# Patient Record
Sex: FEMALE | Race: BLACK OR AFRICAN AMERICAN | NOT HISPANIC OR LATINO | Employment: UNEMPLOYED | RURAL
[De-identification: names, ages, dates, MRNs, and addresses within clinical notes are randomized per-mention and may not be internally consistent; named-entity substitution may affect disease eponyms.]

---

## 2020-08-13 ENCOUNTER — HISTORICAL (OUTPATIENT)
Dept: ADMINISTRATIVE | Facility: HOSPITAL | Age: 50
End: 2020-08-13

## 2022-02-08 ENCOUNTER — OFFICE VISIT (OUTPATIENT)
Dept: PRIMARY CARE CLINIC | Facility: CLINIC | Age: 52
End: 2022-02-08
Payer: MEDICARE

## 2022-02-08 VITALS
DIASTOLIC BLOOD PRESSURE: 110 MMHG | RESPIRATION RATE: 20 BRPM | TEMPERATURE: 100 F | OXYGEN SATURATION: 97 % | HEART RATE: 110 BPM | BODY MASS INDEX: 45.45 KG/M2 | SYSTOLIC BLOOD PRESSURE: 186 MMHG | HEIGHT: 62 IN | WEIGHT: 247 LBS

## 2022-02-08 DIAGNOSIS — D64.9 ANEMIA, UNSPECIFIED TYPE: ICD-10-CM

## 2022-02-08 DIAGNOSIS — E78.5 HYPERLIPIDEMIA, UNSPECIFIED HYPERLIPIDEMIA TYPE: Primary | ICD-10-CM

## 2022-02-08 DIAGNOSIS — I10 ESSENTIAL HYPERTENSION, BENIGN: ICD-10-CM

## 2022-02-08 DIAGNOSIS — E11.9 DIABETES MELLITUS WITHOUT COMPLICATION: Primary | ICD-10-CM

## 2022-02-08 PROCEDURE — 99214 PR OFFICE/OUTPT VISIT, EST, LEVL IV, 30-39 MIN: ICD-10-PCS | Mod: ,,, | Performed by: NURSE PRACTITIONER

## 2022-02-08 PROCEDURE — 99214 OFFICE O/P EST MOD 30 MIN: CPT | Mod: ,,, | Performed by: NURSE PRACTITIONER

## 2022-02-08 RX ORDER — LISINOPRIL 40 MG/1
40 TABLET ORAL DAILY
Qty: 90 TABLET | Refills: 1 | Status: SHIPPED | OUTPATIENT
Start: 2022-02-08 | End: 2022-04-11 | Stop reason: SDUPTHER

## 2022-02-08 RX ORDER — DAPAGLIFLOZIN 10 MG/1
10 TABLET, FILM COATED ORAL DAILY
Qty: 90 TABLET | Refills: 1 | Status: SHIPPED | OUTPATIENT
Start: 2022-02-08 | End: 2022-04-13

## 2022-02-08 RX ORDER — METFORMIN HYDROCHLORIDE 500 MG/1
1000 TABLET ORAL 2 TIMES DAILY WITH MEALS
COMMUNITY
End: 2022-02-08 | Stop reason: SDUPTHER

## 2022-02-08 RX ORDER — LISINOPRIL 40 MG/1
40 TABLET ORAL DAILY
COMMUNITY
End: 2022-02-08 | Stop reason: SDUPTHER

## 2022-02-08 RX ORDER — ROSUVASTATIN CALCIUM 10 MG/1
10 TABLET, COATED ORAL NIGHTLY
Qty: 90 TABLET | Refills: 3 | Status: SHIPPED | OUTPATIENT
Start: 2022-02-08 | End: 2022-04-11

## 2022-02-08 RX ORDER — DAPAGLIFLOZIN 10 MG/1
10 TABLET, FILM COATED ORAL DAILY
COMMUNITY
End: 2022-02-08 | Stop reason: SDUPTHER

## 2022-02-08 RX ORDER — METFORMIN HYDROCHLORIDE 500 MG/1
1000 TABLET ORAL 2 TIMES DAILY WITH MEALS
Qty: 180 TABLET | Refills: 1 | Status: SHIPPED | OUTPATIENT
Start: 2022-02-08 | End: 2022-05-11

## 2022-02-08 RX ORDER — FERROUS SULFATE 324(65)MG
324 TABLET, DELAYED RELEASE (ENTERIC COATED) ORAL 2 TIMES DAILY
Qty: 180 TABLET | Refills: 1 | Status: SHIPPED | OUTPATIENT
Start: 2022-02-08 | End: 2022-02-22

## 2022-02-08 NOTE — PATIENT INSTRUCTIONS
Patient Education       DASH Diet   About this topic   DASH stands for Dietary Approaches to Stop Hypertension. The DASH diet may help you lower blood pressure. It may also help keep you from getting high blood pressure. You will eat less fat and more fiber on the DASH diet.  This diet gives you more minerals that fight high blood pressure. Some nutrients in this diet are:  · Potassium ? Acts to help you get rid of salt. This may help to lower blood pressure.  · Calcium ? Makes blood vessels and muscles work the right way  · Magnesium - Helps blood vessels relax  · Fiber ? Helps you feel full. It also helps digestion.  What will the results be?   The DASH diet may help you:  · Lower your blood pressure and cholesterol  · Lower your risk for cancer, heart disease, heart attack, and stroke. It may also lower your risk for heart failure, kidney stones, and diabetes.  · Lose weight or keep a healthy weight  What lifestyle changes are needed?   · Add regular exercise to get the most help from this diet.  · Try to lower stress. Find ways to relax.  · Stop smoking. Avoid secondhand smoke.  · Limit alcohol intake.  What changes to diet are needed?   · Know about poor eating habits. Then, you can fix them as you work with the program.  · This diet encourages fruits and vegetables, whole grains, lean meats, healthy fats, and low-fat or fat-free dairy products.  · This diet is lower in saturated fats, trans-fats, cholesterol, added sugars, and sodium.  Who should use this diet?   This eating plan is good for the whole family. It is also good for people with high blood pressure and those at risk for high blood pressure.  What foods are good to eat?   · Grains: Try to eat 6 to 8 servings of whole grain, high fiber foods each day. These are bread, cereals, brown rice, or pasta.  · Fruits and vegetables: Eat 4 to 5 servings each day. Try to pick many kinds and colors. Fresh or frozen are best. Look for low sodium or salt-free if  you choose canned.  · Dairy: Try to eat 2 to 3 servings of fat free and low fat milk products each day.  · Lean meats, poultry, and seafood: Try to eat 6 servings or less of lean meats, poultry, and seafood each day. Try to choose more low fat or lean meats like chicken and turkey. Eat less red meat. Eat more fish instead.  · Nuts, seeds, and legumes (dry beans and peas): Try to eat 4 to 5 servings each week. Try to pick nuts such as almonds and walnuts, sunflower seeds, peanut butter, soy beans, lentils, kidney beans, and split peas.  · Fats and oils: Try to eat 2 to 3 servings of fats and oils each day. Eat good fats found in fish, nuts, and avocados. Try using olive oil or vegetable oils such as canola oil. Other good oils to try are corn, safflower, sunflower, or soybean oils. Use low-sodium and low-fat salad dressing and mayonnaise.  · Condiments: Pepper, herbs, spices, vinegar, lemon or lime juices are great for seasoning. Be careful to choose low-sodium or salt-free products if you use broths, soups, or soy sauce.  · Sweets: Try to eat less than 5 servings each week. Choose low-fat and trans fat-free desserts. These are things like fruit flavored gelatin, sorbet, jelly beans, charline crackers, animal crackers, low-fat fig bars, and chele snaps. Eat fruit to satisfy your desire for sweets.     What foods should be limited or avoided?   · Grains: Salted breads, rolls, crackers, quick breads, self-rising flours, biscuit mixes, regular bread crumbs, instant hot cereals, commercially-prepared rice, pasta, stuffing mixes  · Fruits and vegetables: Commercially-prepared potatoes and vegetable mixes, regular canned vegetables and juices, vegetables frozen with sauce or pickled vegetables, processed fruits with salt or sodium  · Milk: Whole milk, malted milk, chocolate milk, buttermilk, cheese, ice cream  · Meats and beans: Smoked, cured, salted, or canned fish; meats or poultry such as lua, sausages, sardines;  high-fat cuts of meat like beef, lamb, or pork; chicken with the skin on it  · Fats: Cut back on solid fats like butter, lard, and margarine. Eat less food with high saturated fat, cholesterol and total fat.  · Condiments and snacks: Salted and canned peas, beans, and olives; salted snack foods; fried foods; soda or other sweetened drinks  · Sweets: High-fat baked goods such as muffins, donuts, pastries, commercial baked goods, candy bars  · If you choose to drink alcohol, limit the amount you drink. Women should have 1 drink or less per day and men should have 2 drinks or less per day.  Helpful tips   · Avoid eating canned vegetables and processed foods. These have a lot of salt in them. Look for a low-salt or low-sodium choice.  · Try baking or broiling instead of frying food.  · Write down the foods you eat. This will help you track what you have eaten each week.  · When you go to a grocery store, have a list or a meal plan. Do not shop when you are hungry to avoid cravings for foods.  · Read food labels with care. They will show you how much is in a serving. The amount is given as a percentage of the total amount you need each day. Reading labels will help you make healthy food choices.       · Avoid fast foods.  · Talk to your doctor or dietitian to see if you need vitamin and mineral supplements to help you balance your diet.  · Talk to a dietitian for help.  Where can I learn more?   Academy of Nutrition and Dietetics  https://www.eatright.org/health/wellness/heart-and-cardiovascular-health/dash-diet-reducing-hypertension-through-diet-and-lifestyle   FamilyDoctor.org  http://familydoctor.org/familydoctor/en/prevention-wellness/food-nutrition/weight-loss/the-dash-diet-healthy-eating-to-control-your-blood-pressure.html   Last Reviewed Date   2021-03-15  Consumer Information Use and Disclaimer   This information is not specific medical advice and does not replace information you receive from your health care  provider. This is only a brief summary of general information. It does NOT include all information about conditions, illnesses, injuries, tests, procedures, treatments, therapies, discharge instructions or life-style choices that may apply to you. You must talk with your health care provider for complete information about your health and treatment options. This information should not be used to decide whether or not to accept your health care providers advice, instructions or recommendations. Only your health care provider has the knowledge and training to provide advice that is right for you.  Copyright   Copyright © 2021 UpTo[employer], Inc. and its affiliates and/or licensors. All rights reserved.  Patient Education       High Blood Pressure Discharge Instructions   About this topic   The medical name for high blood pressure is hypertension. Your blood pressure is measured with 2 numbers. For example, you may hear the staff say your blood pressure is 130 over 80. High blood pressure cannot be cured. You must control it with drugs and lifestyle changes. High blood pressure puts you at risk for heart attack, stroke, and kidney disease.         What care is needed at home?   · Ask your doctor what you need to do when you go home. Make sure you ask questions if you do not understand what the doctor says. This way you will know what you need to do.  · Take all your medicines as ordered. Do not stop taking any of your regular medicines without talking to your doctor.  · Learn how to check your blood pressure at home, if your doctor suggests you do this.  What follow-up care is needed?   Your doctor may ask you to make visits to the office to check on your progress. Be sure to keep these visits.  What drugs may be needed?   The doctor may order drugs to help control your high blood pressure. Take your drugs as ordered. Do not take other prescription drugs or over-the-counter (OTC) drugs without talking to your doctor  first.  Will physical activity be limited?   Talk to your doctor about the right amount of activity for you. Exercise may help you lose weight and lower your blood pressure.  What changes to diet are needed?   · Do not use salt on your food. Use herbs and spices to improve the taste.  · Eat less than 1,500 mg of sodium a day. Read food labels to see how much sodium is in a food.  · Limit coffee, tea, and soda to 2 cups (480 mL) a day.  · Limit beer, wine, and mixed drinks (alcohol) to 1 drink a day for women and 2 drinks a day for men.  · Eat lots of fruits, vegetables, and low-fat dairy products.  · Avoid fatty foods like fried foods or chips.  · Talk with your dietitian about the diet changes you need and the number of calories you should eat each day.  What problems could happen?   · Heart attack, heart failure, or other heart problems  · Stroke  · Swelling of blood vessels  · Kidney failure  · Loss of eyesight  · Memory problems  · Fluid in the lungs  · Death  What can be done to prevent this health problem?   · Exercise regularly. Try to get at least 30 minutes of exercise most days of the week.  · Avoid weight gain.  · Stop smoking. Your doctor can tell you about stop smoking programs.  · Learn to lower stress.  When do I need to call the doctor?   · You have signs of a heart attack, which may include:  ? Severe chest pain, pressure, or discomfort with:  § Breathing trouble, sweating, upset stomach, or cold, clammy skin  § Pain in your arms, back, or jaw  § Worse pain with activity like walking up stairs  ? Fast or irregular heartbeat  ? Feeling dizzy, faint, or weak  · You have signs of stroke like sudden:  ? Numbness or weakness of the face, arm, or leg, especially on one side of the body  ? Confusion, trouble speaking, or understanding  ? Trouble seeing in one or both eyes  ? Trouble walking, dizziness, loss of balance, or coordination  ? Severe headache with no known cause  · You have a seizure or pass  out.  · You have a severe headache with an upset stomach or throwing up.  · You have sudden, severe back pain.  · You have 2 home blood pressure readings higher than 180/120.  · Your urine is brown or bloody.  Teach Back: Helping You Understand   The Teach Back Method helps you understand the information we are giving you. After you talk with the staff, tell them in your own words what you learned. This helps to make sure the staff has described each thing clearly. It also helps to explain things that may have been confusing. Before going home, make sure you can do these:  · I can tell you about my condition.  · I can tell you what numbers are too high for my blood pressure.  · I can tell you what I will do if I have signs of a heart attack or stroke.  Where can I learn more?   American Academy of Family Physicians  https://familydoctor.org/condition/high-blood-pressure/   American Academy of Family Physicians  https://familydoctor.org/the-dash-diet-healthy-eating-to-control-your-blood-pressure/   American Heart Association  http://www.heart.org/HEARTORG/Conditions/HighBloodPressure/AboutHighBloodPressure/About-High-Blood-Pressure_UC_002050_Article.jsp#.B7rS2GynA0d   NHS Choices  http://www.nhs.uk/conditions/blood-pressure-(high)/pages/introduction.aspx   [employer]  https://www.[employer]/contents/high-blood-pressure-in-adults-beyond-the-basics?source=see_link   Last Reviewed Date   2021-06-08  Consumer Information Use and Disclaimer   This information is not specific medical advice and does not replace information you receive from your health care provider. This is only a brief summary of general information. It does NOT include all information about conditions, illnesses, injuries, tests, procedures, treatments, therapies, discharge instructions or life-style choices that may apply to you. You must talk with your health care provider for complete information about your health and treatment options. This  information should not be used to decide whether or not to accept your health care providers advice, instructions or recommendations. Only your health care provider has the knowledge and training to provide advice that is right for you.  Copyright   Copyright © 2021 UpToDate, Inc. and its affiliates and/or licensors. All rights reserved.

## 2022-02-08 NOTE — PROGRESS NOTES
Subjective:       Patient ID: Alley Banks is a 51 y.o. female.    Chief Complaint: Establish Care (Has been out of meds for awhile. Has appt with quang heck in April.)    Pt presents to establish care. Pt states she has been out of medications for months.     Review of Systems   Constitutional: Negative for activity change, appetite change, chills, fatigue and fever.   HENT: Negative for nasal congestion, ear discharge, nosebleeds, postnasal drip, rhinorrhea, sinus pressure/congestion, sneezing, sore throat and tinnitus.    Eyes: Negative for pain, discharge, redness and itching.   Respiratory: Negative for cough, choking, chest tightness, shortness of breath and wheezing.    Cardiovascular: Negative for chest pain.   Gastrointestinal: Negative for abdominal distention, abdominal pain, blood in stool, change in bowel habit, constipation, diarrhea, nausea, vomiting and change in bowel habit.   Genitourinary: Negative for decreased urine volume, dysuria, flank pain and frequency.   Musculoskeletal: Negative for back pain and gait problem.   Integumentary:  Negative for wound, breast mass and breast discharge.   Allergic/Immunologic: Negative for immunocompromised state.   Neurological: Negative for dizziness, light-headedness and headaches.   Psychiatric/Behavioral: Negative for agitation, behavioral problems and hallucinations.   Breast: Negative for mass        Objective:      Physical Exam  Vitals and nursing note reviewed.   Constitutional:       Appearance: Normal appearance.   Cardiovascular:      Rate and Rhythm: Normal rate and regular rhythm.      Heart sounds: Normal heart sounds.   Pulmonary:      Effort: Pulmonary effort is normal.      Breath sounds: Normal breath sounds.   Musculoskeletal:         General: Normal range of motion.   Neurological:      Mental Status: She is alert and oriented to person, place, and time.   Psychiatric:         Behavior: Behavior normal.         Assessment:       Problem  List Items Addressed This Visit    None     Visit Diagnoses     Diabetes mellitus without complication    -  Primary    Relevant Medications    metFORMIN (GLUCOPHAGE) 500 MG tablet    lisinopriL (PRINIVIL,ZESTRIL) 40 MG tablet    dapagliflozin (FARXIGA) 10 mg tablet    Other Relevant Orders    Hemoglobin A1C    Essential hypertension, benign        Relevant Orders    CBC Auto Differential    Comprehensive Metabolic Panel    Lipid Panel    TSH          Plan:       Will call pt with lab results. Return to the clinic in 2 weeks for a hypertension follow-up. Go to the er with any hypertensive symptoms. Pt has an appointment with LEX Montalvo in April for diabetes management.

## 2022-02-22 ENCOUNTER — OFFICE VISIT (OUTPATIENT)
Dept: PRIMARY CARE CLINIC | Facility: CLINIC | Age: 52
End: 2022-02-22
Payer: MEDICARE

## 2022-02-22 VITALS
TEMPERATURE: 100 F | WEIGHT: 245 LBS | BODY MASS INDEX: 45.08 KG/M2 | HEART RATE: 102 BPM | RESPIRATION RATE: 18 BRPM | SYSTOLIC BLOOD PRESSURE: 106 MMHG | DIASTOLIC BLOOD PRESSURE: 60 MMHG | HEIGHT: 62 IN | OXYGEN SATURATION: 98 %

## 2022-02-22 DIAGNOSIS — E11.9 DIABETES MELLITUS WITHOUT COMPLICATION: ICD-10-CM

## 2022-02-22 DIAGNOSIS — I10 ESSENTIAL HYPERTENSION, BENIGN: Primary | ICD-10-CM

## 2022-02-22 DIAGNOSIS — E78.5 HYPERLIPIDEMIA, UNSPECIFIED HYPERLIPIDEMIA TYPE: ICD-10-CM

## 2022-02-22 PROCEDURE — 4010F PR ACE/ARB THEARPY RXD/TAKEN: ICD-10-PCS | Mod: CPTII,,, | Performed by: NURSE PRACTITIONER

## 2022-02-22 PROCEDURE — 3008F PR BODY MASS INDEX (BMI) DOCUMENTED: ICD-10-PCS | Mod: CPTII,,, | Performed by: NURSE PRACTITIONER

## 2022-02-22 PROCEDURE — 3046F PR MOST RECENT HEMOGLOBIN A1C LEVEL > 9.0%: ICD-10-PCS | Mod: CPTII,,, | Performed by: NURSE PRACTITIONER

## 2022-02-22 PROCEDURE — 99212 OFFICE O/P EST SF 10 MIN: CPT | Mod: ,,, | Performed by: NURSE PRACTITIONER

## 2022-02-22 PROCEDURE — 4010F ACE/ARB THERAPY RXD/TAKEN: CPT | Mod: CPTII,,, | Performed by: NURSE PRACTITIONER

## 2022-02-22 PROCEDURE — 3074F PR MOST RECENT SYSTOLIC BLOOD PRESSURE < 130 MM HG: ICD-10-PCS | Mod: CPTII,,, | Performed by: NURSE PRACTITIONER

## 2022-02-22 PROCEDURE — 3008F BODY MASS INDEX DOCD: CPT | Mod: CPTII,,, | Performed by: NURSE PRACTITIONER

## 2022-02-22 PROCEDURE — 3074F SYST BP LT 130 MM HG: CPT | Mod: CPTII,,, | Performed by: NURSE PRACTITIONER

## 2022-02-22 PROCEDURE — 3078F DIAST BP <80 MM HG: CPT | Mod: CPTII,,, | Performed by: NURSE PRACTITIONER

## 2022-02-22 PROCEDURE — 1160F PR REVIEW ALL MEDS BY PRESCRIBER/CLIN PHARMACIST DOCUMENTED: ICD-10-PCS | Mod: CPTII,,, | Performed by: NURSE PRACTITIONER

## 2022-02-22 PROCEDURE — 1159F MED LIST DOCD IN RCRD: CPT | Mod: CPTII,,, | Performed by: NURSE PRACTITIONER

## 2022-02-22 PROCEDURE — 3046F HEMOGLOBIN A1C LEVEL >9.0%: CPT | Mod: CPTII,,, | Performed by: NURSE PRACTITIONER

## 2022-02-22 PROCEDURE — 1160F RVW MEDS BY RX/DR IN RCRD: CPT | Mod: CPTII,,, | Performed by: NURSE PRACTITIONER

## 2022-02-22 PROCEDURE — 1159F PR MEDICATION LIST DOCUMENTED IN MEDICAL RECORD: ICD-10-PCS | Mod: CPTII,,, | Performed by: NURSE PRACTITIONER

## 2022-02-22 PROCEDURE — 99212 PR OFFICE/OUTPT VISIT, EST, LEVL II, 10-19 MIN: ICD-10-PCS | Mod: ,,, | Performed by: NURSE PRACTITIONER

## 2022-02-22 PROCEDURE — 3078F PR MOST RECENT DIASTOLIC BLOOD PRESSURE < 80 MM HG: ICD-10-PCS | Mod: CPTII,,, | Performed by: NURSE PRACTITIONER

## 2022-02-22 RX ORDER — FERROUS SULFATE TAB 325 MG (65 MG ELEMENTAL FE) 325 (65 FE) MG
TAB ORAL 2 TIMES DAILY
COMMUNITY
Start: 2022-02-08

## 2022-02-22 NOTE — PROGRESS NOTES
Subjective:       Patient ID: Alley Banks is a 51 y.o. female.    Chief Complaint: Follow-up (2 week f/u.)    Pt presents for a hypertension follow-up. Pt is requesting a glucometer.     Review of Systems   Constitutional: Negative for activity change, appetite change, chills, fatigue and fever.   HENT: Negative for nasal congestion, ear discharge, nosebleeds, postnasal drip, rhinorrhea, sinus pressure/congestion, sneezing, sore throat and tinnitus.    Eyes: Negative for pain, discharge, redness and itching.   Respiratory: Negative for cough, choking, chest tightness, shortness of breath and wheezing.    Cardiovascular: Negative for chest pain.   Gastrointestinal: Negative for abdominal distention, abdominal pain, blood in stool, change in bowel habit, constipation, diarrhea, nausea, vomiting and change in bowel habit.   Genitourinary: Negative for decreased urine volume, dysuria, flank pain and frequency.   Musculoskeletal: Negative for back pain and gait problem.   Integumentary:  Negative for wound, breast mass and breast discharge.   Allergic/Immunologic: Negative for immunocompromised state.   Neurological: Negative for dizziness, light-headedness and headaches.   Psychiatric/Behavioral: Negative for agitation, behavioral problems and hallucinations.   Breast: Negative for mass        Objective:      Physical Exam  Vitals and nursing note reviewed.   Constitutional:       Appearance: Normal appearance.   Cardiovascular:      Rate and Rhythm: Normal rate and regular rhythm.      Heart sounds: Normal heart sounds.   Pulmonary:      Effort: Pulmonary effort is normal.      Breath sounds: Normal breath sounds.   Musculoskeletal:         General: Normal range of motion.   Neurological:      Mental Status: She is alert and oriented to person, place, and time.   Psychiatric:         Behavior: Behavior normal.         Assessment:       Problem List Items Addressed This Visit    None     Visit Diagnoses     Essential  hypertension, benign    -  Primary    Diabetes mellitus without complication        Hyperlipidemia, unspecified hyperlipidemia type              Plan:       Gave pt a Gatsanjay and Veto glucometer bag. Return to the clinic in 3 months for labs and a visit.

## 2022-04-11 ENCOUNTER — OFFICE VISIT (OUTPATIENT)
Dept: DIABETES SERVICES | Facility: CLINIC | Age: 52
End: 2022-04-11
Payer: MEDICARE

## 2022-04-11 VITALS
HEART RATE: 90 BPM | HEIGHT: 62 IN | WEIGHT: 246.38 LBS | SYSTOLIC BLOOD PRESSURE: 156 MMHG | OXYGEN SATURATION: 95 % | BODY MASS INDEX: 45.34 KG/M2 | RESPIRATION RATE: 16 BRPM | DIASTOLIC BLOOD PRESSURE: 108 MMHG

## 2022-04-11 DIAGNOSIS — E11.9 DIABETES MELLITUS WITHOUT COMPLICATION: ICD-10-CM

## 2022-04-11 DIAGNOSIS — E11.9 TYPE 2 DIABETES MELLITUS WITHOUT COMPLICATION, WITHOUT LONG-TERM CURRENT USE OF INSULIN: Primary | ICD-10-CM

## 2022-04-11 DIAGNOSIS — I10 PRIMARY HYPERTENSION: ICD-10-CM

## 2022-04-11 DIAGNOSIS — E78.5 HYPERLIPIDEMIA, UNSPECIFIED HYPERLIPIDEMIA TYPE: ICD-10-CM

## 2022-04-11 LAB — GLUCOSE SERPL-MCNC: 165 MG/DL (ref 70–110)

## 2022-04-11 PROCEDURE — 99215 OFFICE O/P EST HI 40 MIN: CPT | Mod: PBBFAC | Performed by: NURSE PRACTITIONER

## 2022-04-11 PROCEDURE — 4010F ACE/ARB THERAPY RXD/TAKEN: CPT | Mod: CPTII,,, | Performed by: NURSE PRACTITIONER

## 2022-04-11 PROCEDURE — 3046F HEMOGLOBIN A1C LEVEL >9.0%: CPT | Mod: CPTII,,, | Performed by: NURSE PRACTITIONER

## 2022-04-11 PROCEDURE — 3008F PR BODY MASS INDEX (BMI) DOCUMENTED: ICD-10-PCS | Mod: CPTII,,, | Performed by: NURSE PRACTITIONER

## 2022-04-11 PROCEDURE — 99214 OFFICE O/P EST MOD 30 MIN: CPT | Mod: S$PBB,,, | Performed by: NURSE PRACTITIONER

## 2022-04-11 PROCEDURE — 1159F MED LIST DOCD IN RCRD: CPT | Mod: CPTII,,, | Performed by: NURSE PRACTITIONER

## 2022-04-11 PROCEDURE — 3008F BODY MASS INDEX DOCD: CPT | Mod: CPTII,,, | Performed by: NURSE PRACTITIONER

## 2022-04-11 PROCEDURE — 3077F SYST BP >= 140 MM HG: CPT | Mod: CPTII,,, | Performed by: NURSE PRACTITIONER

## 2022-04-11 PROCEDURE — 82962 GLUCOSE BLOOD TEST: CPT | Mod: PBBFAC | Performed by: NURSE PRACTITIONER

## 2022-04-11 PROCEDURE — 3077F PR MOST RECENT SYSTOLIC BLOOD PRESSURE >= 140 MM HG: ICD-10-PCS | Mod: CPTII,,, | Performed by: NURSE PRACTITIONER

## 2022-04-11 PROCEDURE — 1160F PR REVIEW ALL MEDS BY PRESCRIBER/CLIN PHARMACIST DOCUMENTED: ICD-10-PCS | Mod: CPTII,,, | Performed by: NURSE PRACTITIONER

## 2022-04-11 PROCEDURE — 1159F PR MEDICATION LIST DOCUMENTED IN MEDICAL RECORD: ICD-10-PCS | Mod: CPTII,,, | Performed by: NURSE PRACTITIONER

## 2022-04-11 PROCEDURE — 4010F PR ACE/ARB THEARPY RXD/TAKEN: ICD-10-PCS | Mod: CPTII,,, | Performed by: NURSE PRACTITIONER

## 2022-04-11 PROCEDURE — 3046F PR MOST RECENT HEMOGLOBIN A1C LEVEL > 9.0%: ICD-10-PCS | Mod: CPTII,,, | Performed by: NURSE PRACTITIONER

## 2022-04-11 PROCEDURE — 1160F RVW MEDS BY RX/DR IN RCRD: CPT | Mod: CPTII,,, | Performed by: NURSE PRACTITIONER

## 2022-04-11 PROCEDURE — 99214 PR OFFICE/OUTPT VISIT, EST, LEVL IV, 30-39 MIN: ICD-10-PCS | Mod: S$PBB,,, | Performed by: NURSE PRACTITIONER

## 2022-04-11 PROCEDURE — 3080F DIAST BP >= 90 MM HG: CPT | Mod: CPTII,,, | Performed by: NURSE PRACTITIONER

## 2022-04-11 PROCEDURE — 3080F PR MOST RECENT DIASTOLIC BLOOD PRESSURE >= 90 MM HG: ICD-10-PCS | Mod: CPTII,,, | Performed by: NURSE PRACTITIONER

## 2022-04-11 RX ORDER — LISINOPRIL 40 MG/1
40 TABLET ORAL DAILY
Qty: 90 TABLET | Refills: 1 | Status: SHIPPED | OUTPATIENT
Start: 2022-04-11 | End: 2022-08-02

## 2022-04-11 RX ORDER — AMLODIPINE BESYLATE 10 MG/1
10 TABLET ORAL DAILY
Qty: 90 TABLET | Refills: 1 | Status: SHIPPED | OUTPATIENT
Start: 2022-04-11 | End: 2022-08-02

## 2022-04-11 RX ORDER — ROSUVASTATIN CALCIUM 20 MG/1
20 TABLET, COATED ORAL DAILY
Qty: 90 TABLET | Refills: 3 | Status: SHIPPED | OUTPATIENT
Start: 2022-04-11 | End: 2022-11-16 | Stop reason: SDUPTHER

## 2022-04-11 NOTE — PROGRESS NOTES
Subjective:       Patient ID: Alley Banks is a 52 y.o. female.    Chief Complaint: Diabetes Mellitus (Pt here for follow up visit, reports her sugars are usually good, checks 1 x day.)    Here today for follow up, has been lost to follow up since 10/2020.  She was seen by BENJAMÍN Abraham last month and a1c was 9.1.  Started on farxiga 10mg. Given [facility] meter and supplies but has not received supplies.  We will ensure that they have an order and she has been advised to answer the phone so she can approve shipment. She did not bring in her meter and reports that glucose has been better since starting farxiga but is really not able to give me examples of readings.  Her blood pressure is uncontrolled today as well.  She has a speech impediment and it is difficult to get an understanding as to how much information she is able to understand in regards to education. For this reason we will refer to home health for assistance in ensuring she gets supplies and is checking her glucose as well as monitoring her blood pressure and ensuring she is taking medications as directed. Advised to increase crestor as last ldl is uncontrolled.    Review of Systems   Constitutional: Negative for activity change, appetite change, diaphoresis and fatigue.   HENT: Negative for nasal congestion, facial swelling and sinus pressure/congestion.    Eyes: Negative for visual disturbance.   Respiratory: Negative for shortness of breath and wheezing.    Cardiovascular: Negative for chest pain and leg swelling.   Gastrointestinal: Negative for constipation, diarrhea, nausea and vomiting.   Endocrine: Negative for polydipsia, polyphagia and polyuria.   Genitourinary: Negative for dysuria, frequency and urgency.   Musculoskeletal: Negative for gait problem and myalgias.   Integumentary:  Negative for color change, rash and wound.   Neurological: Negative for dizziness, syncope, weakness, headaches, disturbances in coordination and coordination  difficulties.   Hematological: Does not bruise/bleed easily.   Psychiatric/Behavioral: Negative for self-injury, sleep disturbance and suicidal ideas. The patient is not nervous/anxious.          Objective:      Physical Exam  Vitals and nursing note reviewed.   Constitutional:       Appearance: Normal appearance.   HENT:      Head: Normocephalic.   Cardiovascular:      Rate and Rhythm: Normal rate.   Pulmonary:      Effort: Pulmonary effort is normal.   Musculoskeletal:         General: Normal range of motion.   Skin:     General: Skin is warm and dry.   Neurological:      General: No focal deficit present.      Mental Status: She is alert and oriented to person, place, and time.   Psychiatric:         Mood and Affect: Mood normal.         Behavior: Behavior normal.         Thought Content: Thought content normal.         Judgment: Judgment normal.         Assessment:       Problem List Items Addressed This Visit        Cardiac/Vascular    Hyperlipidemia    Hypertension       Endocrine    Diabetes mellitus, type 2 - Primary    Relevant Orders    POCT Glucose, Hand-Held Device (Completed)          Plan:       Problem List Items Addressed This Visit        Cardiac/Vascular    Hyperlipidemia    Hypertension       Endocrine    Diabetes mellitus, type 2 - Primary    Relevant Orders    POCT Glucose, Hand-Held Device (Completed)             Continue on lisinopril 40mg daily and add norvasc 10mg once daily for blood pressure  Continue metformin and farxgia and checking glucose fasting and 2 hours after last meal of the day.  Bring in meter to next visit in 2 months for a1c follow up.

## 2022-04-11 NOTE — PATIENT INSTRUCTIONS
Increase crestor to 20mg, add [facility]  Referral to home health  Referral to ophthalmologist      Snacks with 0-5 grams carbs   Hard Boiled Egg   Crystal Light, Vitamin Water, Powerade Zero   Herbal tea, unsweetened   8 oz unsweetened almond milk   2 tsp peanut butter on celery   ½ cup sugar-free Jell-O   1 sugar-free popsicle   Non starchy vegetables such as carrots or celery sticks with lowfat dressing   ½ oz lowfat cheese or string cheese   1 closed handful of nuts or tbsp of seeds, unsalted    Snacks with 15 gram carbs  . 1 small piece of fruit or . banana or . cup light canned fruit  . 3 charline cracker squares  . 3 cups popcorn  . 5 Vanilla Wafers  . 1/2 cup low fat, no added sugar ice cream or frozen yogurt  . 1/2 turkey, ham, or chicken sandwich  . 1.2 cup fruit with 1/2 cup of cottage cheese  . 4-6 unsalted wheat crackers with 1 oz low fat cheese or 1 tbsp peanut butter  . 30 goldfish crackers  . 7-8 mini rice cakes  . 1/3 cup hummus dip with raw vegetables  . 1/2 whole wheat dalton, 1 tbsp hummus  . Mini pizza (. whole wheat English muffin, low-fat cheese, tomato sauce)  . 100 calorie snack pack  . 4-6 oz light yogurt  . 1/2 cup sugar-free pudding

## 2022-04-14 NOTE — TELEPHONE ENCOUNTER
----- Message from LEX Owens sent at 4/14/2022  2:01 PM CDT -----  Call in strips and lancets to local pharmacy of pts choice please.  ----- Message -----  From: Santa Mak  Sent: 4/14/2022   1:29 PM CDT  To: LEX Owens    I called james gaffney about the form for [employer], Sheila stated that patient must get supplies from local pharmacy since she has medicare and medicaid

## 2022-05-11 DIAGNOSIS — E11.9 DIABETES MELLITUS WITHOUT COMPLICATION: ICD-10-CM

## 2022-05-11 RX ORDER — METFORMIN HYDROCHLORIDE 500 MG/1
1000 TABLET ORAL 2 TIMES DAILY WITH MEALS
Qty: 360 TABLET | Refills: 1 | Status: SHIPPED | OUTPATIENT
Start: 2022-05-11 | End: 2022-08-02

## 2022-07-18 DIAGNOSIS — Z71.89 COMPLEX CARE COORDINATION: ICD-10-CM

## 2022-08-15 ENCOUNTER — OFFICE VISIT (OUTPATIENT)
Dept: DIABETES SERVICES | Facility: CLINIC | Age: 52
End: 2022-08-15
Payer: MEDICARE

## 2022-08-15 VITALS
OXYGEN SATURATION: 95 % | HEART RATE: 106 BPM | SYSTOLIC BLOOD PRESSURE: 150 MMHG | WEIGHT: 234.38 LBS | RESPIRATION RATE: 16 BRPM | DIASTOLIC BLOOD PRESSURE: 96 MMHG | HEIGHT: 62 IN | BODY MASS INDEX: 43.13 KG/M2

## 2022-08-15 DIAGNOSIS — E78.5 HYPERLIPIDEMIA, UNSPECIFIED HYPERLIPIDEMIA TYPE: ICD-10-CM

## 2022-08-15 DIAGNOSIS — E11.9 TYPE 2 DIABETES MELLITUS WITHOUT COMPLICATION, WITHOUT LONG-TERM CURRENT USE OF INSULIN: Primary | ICD-10-CM

## 2022-08-15 DIAGNOSIS — I10 PRIMARY HYPERTENSION: ICD-10-CM

## 2022-08-15 LAB
GLUCOSE SERPL-MCNC: 146 MG/DL (ref 70–110)
HBA1C MFR BLD: 7.9 % (ref 4.5–6.6)

## 2022-08-15 PROCEDURE — 99213 PR OFFICE/OUTPT VISIT, EST, LEVL III, 20-29 MIN: ICD-10-PCS | Mod: S$PBB,,, | Performed by: NURSE PRACTITIONER

## 2022-08-15 PROCEDURE — 82962 GLUCOSE BLOOD TEST: CPT | Mod: PBBFAC | Performed by: NURSE PRACTITIONER

## 2022-08-15 PROCEDURE — 99213 OFFICE O/P EST LOW 20 MIN: CPT | Mod: S$PBB,,, | Performed by: NURSE PRACTITIONER

## 2022-08-15 PROCEDURE — 99214 OFFICE O/P EST MOD 30 MIN: CPT | Mod: PBBFAC | Performed by: NURSE PRACTITIONER

## 2022-08-15 PROCEDURE — 83036 HEMOGLOBIN GLYCOSYLATED A1C: CPT | Mod: PBBFAC | Performed by: NURSE PRACTITIONER

## 2022-08-15 RX ORDER — DULAGLUTIDE 1.5 MG/.5ML
1.5 INJECTION, SOLUTION SUBCUTANEOUS
Qty: 12 PEN | Refills: 1 | Status: SHIPPED | OUTPATIENT
Start: 2022-08-15 | End: 2022-11-16 | Stop reason: SDUPTHER

## 2022-08-15 NOTE — PATIENT INSTRUCTIONS
Start trulicity 1.5 mg once weekly after 0.75 x 4 weeks with samples.      Pt is advised to monitor and document glucose fasting when you wake up before you eat and 2 hours after meal and bring in meter to next visit.      Ensure to take medications as directed.      Follow diabetic diet as directed.      Work to achieve normal body weight.     Ensure to exercise 4-5 times per week for 20 minutes.  Snacks with 0-5 grams carbs   Hard Boiled Egg   Crystal Light, Vitamin Water, Powerade Zero   Herbal tea, unsweetened   8 oz unsweetened almond milk   2 tsp peanut butter on celery   ½ cup sugar-free Jell-O   1 sugar-free popsicle   Non starchy vegetables such as carrots or celery sticks with lowfat dressing   ½ oz lowfat cheese or string cheese   1 closed handful of nuts or tbsp of seeds, unsalted    Snacks with 15 gram carbs  . 1 small piece of fruit or . banana or . cup light canned fruit  . 3 charline cracker squares  . 3 cups popcorn  . 5 Vanilla Wafers  . 1/2 cup low fat, no added sugar ice cream or frozen yogurt  . 1/2 turkey, ham, or chicken sandwich  . 1.2 cup fruit with 1/2 cup of cottage cheese  . 4-6 unsalted wheat crackers with 1 oz low fat cheese or 1 tbsp peanut butter  . 30 goldfish crackers  . 7-8 mini rice cakes  . 1/3 cup hummus dip with raw vegetables  . 1/2 whole wheat dalton, 1 tbsp hummus  . Mini pizza (. whole wheat English muffin, low-fat cheese, tomato sauce)  . 100 calorie snack pack  . 4-6 oz light yogurt  . 1/2 cup sugar-free pudding

## 2022-08-18 NOTE — PROGRESS NOTES
Subjective:       Patient ID: Alley Banks is a 52 y.o. female.    Chief Complaint: Diabetes Mellitus (Pt here for follow up visit and A1c, denies complaints, checks sugar bid.)    Here today for routine follow up and med refill.  She has done well since last visit.   On ACE/statin  A1c down from 9.1 to 7.9.  She admits that she can eat better.      We discussed compliance with meds and diet is imperative to good control.     Hemoglobin A1C       Date                     Value               Ref Range           Status                08/15/2022               7.9 (A)             4.5 - 6.6 %         Final                 02/08/2022               9.1 (H)             4.5 - 6.6 %         Final              Comment:      Normal:               <5.7%  Pre-Diabetic:       5.7% to 6.4%  Diabetic:             >6.4%  Diabetic Goal:     <7%  ----------  Lab Results       Component                Value               Date                       MICROALBUR               5.8 (H)             04/11/2022            Lab Results       Component                Value               Date                       CHOL                     256 (H)             02/08/2022            Lab Results       Component                Value               Date                       HDL                      54                  02/08/2022            Lab Results       Component                Value               Date                       LDLCALC                  146                 02/08/2022            Lab Results       Component                Value               Date                       TRIG                     280 (H)             02/08/2022            Lab Results       Component                Value               Date                       CHOLHDL                  4.7                 02/08/2022            CMP  Sodium       Date                     Value               Ref Range           Status                02/08/2022               133 (L)             136  - 145 mmol*     Final            ----------  Potassium       Date                     Value               Ref Range           Status                02/08/2022               4.6                 3.5 - 5.1 mmol*     Final            ----------  Chloride       Date                     Value               Ref Range           Status                02/08/2022               98                  98 - 107 mmol/L     Final            ----------  CO2       Date                     Value               Ref Range           Status                02/08/2022               27                  21 - 32 mmol/L      Final            ----------  Glucose       Date                     Value               Ref Range           Status                02/08/2022               334 (H)             74 - 106 mg/dL      Final            ----------  BUN       Date                     Value               Ref Range           Status                02/08/2022               20 (H)              7 - 18 mg/dL        Final            ----------  Creatinine       Date                     Value               Ref Range           Status                02/08/2022               0.93                0.55 - 1.02 mg*     Final            ----------  Calcium       Date                     Value               Ref Range           Status                02/08/2022               10.2 (H)            8.5 - 10.1 mg/*     Final            ----------  Total Protein       Date                     Value               Ref Range           Status                02/08/2022               8.4 (H)             6.4 - 8.2 g/dL      Final            ----------  Albumin       Date                     Value               Ref Range           Status                02/08/2022               3.5                 3.5 - 5.0 g/dL      Final            ----------  Bilirubin, Total       Date                     Value               Ref Range           Status                02/08/2022               0.2                  0.0 - 1.2 mg/dL     Final            ----------  Alk Phos       Date                     Value               Ref Range           Status                02/08/2022               95                  41 - 108 U/L        Final            ----------  AST       Date                     Value               Ref Range           Status                02/08/2022               21                  15 - 37 U/L         Final            ----------  ALT       Date                     Value               Ref Range           Status                02/08/2022               27                  13 - 56 U/L         Final            ----------  Anion Gap       Date                     Value               Ref Range           Status                02/08/2022               13                  7 - 16 mmol/L       Final            ----------  eGFR       Date                     Value               Ref Range           Status                02/08/2022               68                  >=60 mL/min/1.*     Final            ----------      Review of Systems   Constitutional: Negative for activity change, appetite change, diaphoresis and fatigue.   HENT: Negative for nasal congestion, facial swelling and sinus pressure/congestion.    Eyes: Negative for visual disturbance.   Respiratory: Negative for shortness of breath and wheezing.    Cardiovascular: Negative for chest pain and leg swelling.   Gastrointestinal: Negative for constipation, diarrhea, nausea and vomiting.   Endocrine: Negative for polydipsia, polyphagia and polyuria.   Genitourinary: Negative for dysuria, frequency and urgency.   Musculoskeletal: Negative for gait problem and myalgias.   Integumentary:  Negative for color change, rash and wound.   Neurological: Negative for dizziness, syncope, weakness, headaches, disturbances in coordination and coordination difficulties.   Hematological: Does not bruise/bleed easily.   Psychiatric/Behavioral: Negative for self-injury, sleep  disturbance and suicidal ideas. The patient is not nervous/anxious.          Objective:      Physical Exam  Vitals and nursing note reviewed.   Constitutional:       Appearance: Normal appearance.   HENT:      Head: Normocephalic.   Cardiovascular:      Rate and Rhythm: Normal rate and regular rhythm.      Pulses:           Dorsalis pedis pulses are 2+ on the right side and 2+ on the left side.        Posterior tibial pulses are 2+ on the right side and 2+ on the left side.      Heart sounds: Normal heart sounds.   Pulmonary:      Effort: Pulmonary effort is normal.      Breath sounds: Normal breath sounds.   Musculoskeletal:         General: Normal range of motion.      Right foot: Normal range of motion.      Left foot: Normal range of motion.   Feet:      Right foot:      Protective Sensation: 6 sites tested. 6 sites sensed.      Skin integrity: Callus and dry skin present.      Toenail Condition: Fungal disease present.     Left foot:      Protective Sensation: 6 sites tested. 6 sites sensed.      Skin integrity: Callus and dry skin present.      Toenail Condition: Fungal disease present.  Skin:     General: Skin is warm and dry.   Neurological:      General: No focal deficit present.      Mental Status: She is alert and oriented to person, place, and time.   Psychiatric:         Mood and Affect: Mood normal.         Behavior: Behavior normal.         Thought Content: Thought content normal.         Judgment: Judgment normal.         Assessment:       Problem List Items Addressed This Visit        Cardiac/Vascular    Hyperlipidemia    Hypertension       Endocrine    Diabetes mellitus, type 2 - Primary    Relevant Medications    dulaglutide (TRULICITY) 1.5 mg/0.5 mL pen injector    Other Relevant Orders    Hemoglobin A1C, POCT (Completed)    POCT Glucose, Hand-Held Device (Completed)          Plan:       Problem List Items Addressed This Visit        Cardiac/Vascular    Hyperlipidemia    Hypertension        Endocrine    Diabetes mellitus, type 2 - Primary    Relevant Medications    dulaglutide (TRULICITY) 1.5 mg/0.5 mL pen injector    Other Relevant Orders    Hemoglobin A1C, POCT (Completed)    POCT Glucose, Hand-Held Device (Completed)        start trulicity and ensure to take all meds as prescribed.  0.75 dose provided to pt in clinic and she demonstrated use of pen. 4 week supply provided then advised to increase to 1.5mg weekly prescription dose.    Pt is advised to monitor and document glucose fasting when you wake up before you eat and 2 hours after meal and bring in meter to next visit.      Ensure to take medications as directed.      Follow diabetic diet as directed.      Work to achieve normal body weight.     Ensure to exercise 4-5 times per week for 20 minutes. Snacks with 0-5 grams carbs   Hard Boiled Egg   Crystal Light, Vitamin Water, Powerade Zero   Herbal tea, unsweetened   8 oz unsweetened almond milk   2 tsp peanut butter on celery   ½ cup sugar-free Jell-O   1 sugar-free popsicle   Non starchy vegetables such as carrots or celery sticks with lowfat dressing   ½ oz lowfat cheese or string cheese   1 closed handful of nuts or tbsp of seeds, unsalted    Snacks with 15 gram carbs  . 1 small piece of fruit or . banana or . cup light canned fruit  . 3 charline cracker squares  . 3 cups popcorn  . 5 Vanilla Wafers  . 1/2 cup low fat, no added sugar ice cream or frozen yogurt  . 1/2 turkey, ham, or chicken sandwich  . 1.2 cup fruit with 1/2 cup of cottage cheese  . 4-6 unsalted wheat crackers with 1 oz low fat cheese or 1 tbsp peanut butter  . 30 goldfish crackers  . 7-8 mini rice cakes  . 1/3 cup hummus dip with raw vegetables  . 1/2 whole wheat dalton, 1 tbsp hummus  . Mini pizza (. whole wheat English muffin, low-fat cheese, tomato sauce)  . 100 calorie snack pack  . 4-6 oz light yogurt  . 1/2 cup sugar-free pudding

## 2022-11-16 ENCOUNTER — OFFICE VISIT (OUTPATIENT)
Dept: DIABETES SERVICES | Facility: CLINIC | Age: 52
End: 2022-11-16
Payer: MEDICARE

## 2022-11-16 VITALS
DIASTOLIC BLOOD PRESSURE: 100 MMHG | WEIGHT: 219.63 LBS | BODY MASS INDEX: 40.42 KG/M2 | OXYGEN SATURATION: 97 % | RESPIRATION RATE: 14 BRPM | HEART RATE: 106 BPM | HEIGHT: 62 IN | SYSTOLIC BLOOD PRESSURE: 144 MMHG

## 2022-11-16 DIAGNOSIS — E11.9 TYPE 2 DIABETES MELLITUS WITHOUT COMPLICATION, WITHOUT LONG-TERM CURRENT USE OF INSULIN: Primary | ICD-10-CM

## 2022-11-16 DIAGNOSIS — E78.5 HYPERLIPIDEMIA, UNSPECIFIED HYPERLIPIDEMIA TYPE: ICD-10-CM

## 2022-11-16 DIAGNOSIS — I10 PRIMARY HYPERTENSION: ICD-10-CM

## 2022-11-16 LAB
GLUCOSE SERPL-MCNC: 109 MG/DL (ref 70–110)
HBA1C MFR BLD: 5.8 % (ref 4.5–6.6)

## 2022-11-16 PROCEDURE — 82962 GLUCOSE BLOOD TEST: CPT | Mod: PBBFAC | Performed by: NURSE PRACTITIONER

## 2022-11-16 PROCEDURE — 83036 HEMOGLOBIN GLYCOSYLATED A1C: CPT | Mod: PBBFAC | Performed by: NURSE PRACTITIONER

## 2022-11-16 PROCEDURE — 99213 PR OFFICE/OUTPT VISIT, EST, LEVL III, 20-29 MIN: ICD-10-PCS | Mod: S$PBB,,, | Performed by: NURSE PRACTITIONER

## 2022-11-16 PROCEDURE — 99213 OFFICE O/P EST LOW 20 MIN: CPT | Mod: S$PBB,,, | Performed by: NURSE PRACTITIONER

## 2022-11-16 PROCEDURE — 99214 OFFICE O/P EST MOD 30 MIN: CPT | Mod: PBBFAC | Performed by: NURSE PRACTITIONER

## 2022-11-16 RX ORDER — DULAGLUTIDE 1.5 MG/.5ML
1.5 INJECTION, SOLUTION SUBCUTANEOUS
Qty: 12 PEN | Refills: 1 | Status: SHIPPED | OUTPATIENT
Start: 2022-11-16 | End: 2023-06-28 | Stop reason: SDUPTHER

## 2022-11-16 RX ORDER — DAPAGLIFLOZIN 10 MG/1
10 TABLET, FILM COATED ORAL DAILY
Qty: 90 TABLET | Refills: 1 | Status: SHIPPED | OUTPATIENT
Start: 2022-11-16 | End: 2023-03-13 | Stop reason: SDUPTHER

## 2022-11-16 RX ORDER — ROSUVASTATIN CALCIUM 20 MG/1
20 TABLET, COATED ORAL DAILY
Qty: 90 TABLET | Refills: 1 | Status: SHIPPED | OUTPATIENT
Start: 2022-11-16 | End: 2023-06-28 | Stop reason: SDUPTHER

## 2022-11-16 RX ORDER — LISINOPRIL 40 MG/1
40 TABLET ORAL DAILY
Qty: 90 TABLET | Refills: 1 | Status: SHIPPED | OUTPATIENT
Start: 2022-11-16 | End: 2023-06-28 | Stop reason: SDUPTHER

## 2022-11-16 RX ORDER — METFORMIN HYDROCHLORIDE 500 MG/1
1000 TABLET ORAL 2 TIMES DAILY WITH MEALS
Qty: 360 TABLET | Refills: 1 | Status: SHIPPED | OUTPATIENT
Start: 2022-11-16 | End: 2023-06-28 | Stop reason: SDUPTHER

## 2022-11-16 NOTE — PROGRESS NOTES
Subjective:       Patient ID: Alley Banks is a 52 y.o. female.    Chief Complaint: Diabetes Mellitus (Pt here for follow up visit and A1c, denies complaints, checks sugar bid.)    Here today for routine evaluation and med refill.  She has not taken her blood pressure meds as she should and it is elevated today.  Advised to ensure to take daily.  She has done very well from a DM standpoint.  A1c down from 7.9 to 5.8     Hemoglobin A1C       Date                     Value               Ref Range           Status                11/16/2022               5.8                 4.5 - 6.6 %         Final                 08/15/2022               7.9 (A)             4.5 - 6.6 %         Final                 02/08/2022               9.1 (H)             4.5 - 6.6 %         Final                Lab Results       Component                Value               Date                       MICROALBUR               5.8 (H)             04/11/2022            Lab Results       Component                Value               Date                       CHOL                     256 (H)             02/08/2022            Lab Results       Component                Value               Date                       HDL                      54                  02/08/2022            Lab Results       Component                Value               Date                       LDLCALC                  146                 02/08/2022            Lab Results       Component                Value               Date                       TRIG                     280 (H)             02/08/2022            Lab Results       Component                Value               Date                       CHOLHDL                  4.7                 02/08/2022            CMP  Sodium       Date                     Value               Ref Range           Status                02/08/2022               133 (L)             136 - 145 mmol*     Final            ----------  Potassium        Date                     Value               Ref Range           Status                02/08/2022               4.6                 3.5 - 5.1 mmol*     Final            ----------  Chloride       Date                     Value               Ref Range           Status                02/08/2022               98                  98 - 107 mmol/L     Final            ----------  CO2       Date                     Value               Ref Range           Status                02/08/2022               27                  21 - 32 mmol/L      Final            ----------  Glucose       Date                     Value               Ref Range           Status                02/08/2022               334 (H)             74 - 106 mg/dL      Final            ----------  BUN       Date                     Value               Ref Range           Status                02/08/2022               20 (H)              7 - 18 mg/dL        Final            ----------  Creatinine       Date                     Value               Ref Range           Status                02/08/2022               0.93                0.55 - 1.02 mg*     Final            ----------  Calcium       Date                     Value               Ref Range           Status                02/08/2022               10.2 (H)            8.5 - 10.1 mg/*     Final            ----------  Total Protein       Date                     Value               Ref Range           Status                02/08/2022               8.4 (H)             6.4 - 8.2 g/dL      Final            ----------  Albumin       Date                     Value               Ref Range           Status                02/08/2022               3.5                 3.5 - 5.0 g/dL      Final            ----------  Bilirubin, Total       Date                     Value               Ref Range           Status                02/08/2022               0.2                 0.0 - 1.2 mg/dL     Final             ----------  Alk Phos       Date                     Value               Ref Range           Status                02/08/2022               95                  41 - 108 U/L        Final            ----------  AST       Date                     Value               Ref Range           Status                02/08/2022               21                  15 - 37 U/L         Final            ----------  ALT       Date                     Value               Ref Range           Status                02/08/2022               27                  13 - 56 U/L         Final            ----------  Anion Gap       Date                     Value               Ref Range           Status                02/08/2022               13                  7 - 16 mmol/L       Final            ----------      Review of Systems   Constitutional:  Negative for activity change, appetite change, diaphoresis and fatigue.   HENT:  Negative for nasal congestion, facial swelling and sinus pressure/congestion.    Eyes:  Negative for visual disturbance.   Respiratory:  Negative for shortness of breath and wheezing.    Cardiovascular:  Negative for chest pain and leg swelling.   Gastrointestinal:  Negative for constipation, diarrhea, nausea and vomiting.   Endocrine: Negative for polydipsia, polyphagia and polyuria.   Genitourinary:  Negative for dysuria, frequency and urgency.   Musculoskeletal:  Negative for gait problem and myalgias.   Integumentary:  Negative for color change, rash and wound.   Neurological:  Negative for dizziness, syncope, weakness, headaches, coordination difficulties and coordination difficulties.   Hematological:  Does not bruise/bleed easily.   Psychiatric/Behavioral:  Negative for self-injury, sleep disturbance and suicidal ideas. The patient is not nervous/anxious.        Objective:      Physical Exam  Vitals and nursing note reviewed.   Constitutional:       Appearance: Normal appearance.   HENT:      Head: Normocephalic.    Cardiovascular:      Rate and Rhythm: Normal rate and regular rhythm.      Pulses:           Dorsalis pedis pulses are 2+ on the right side and 2+ on the left side.        Posterior tibial pulses are 2+ on the right side and 2+ on the left side.      Heart sounds: Normal heart sounds.   Pulmonary:      Effort: Pulmonary effort is normal.      Breath sounds: Normal breath sounds.   Musculoskeletal:         General: Normal range of motion.      Right foot: Normal range of motion.      Left foot: Normal range of motion.   Feet:      Right foot:      Protective Sensation: 6 sites tested.  6 sites sensed.      Skin integrity: Callus and dry skin present.      Toenail Condition: Fungal disease present.     Left foot:      Protective Sensation: 6 sites tested.  6 sites sensed.      Skin integrity: Callus and dry skin present.      Toenail Condition: Fungal disease present.  Skin:     General: Skin is warm and dry.   Neurological:      General: No focal deficit present.      Mental Status: She is alert and oriented to person, place, and time.   Psychiatric:         Mood and Affect: Mood normal.         Behavior: Behavior normal.         Thought Content: Thought content normal.         Judgment: Judgment normal.       Assessment:       Problem List Items Addressed This Visit          Cardiac/Vascular    Hyperlipidemia    Hypertension       Endocrine    Diabetes mellitus, type 2 - Primary    Relevant Medications    dulaglutide (TRULICITY) 1.5 mg/0.5 mL pen injector    dapagliflozin (FARXIGA) 10 mg tablet    lisinopriL (PRINIVIL,ZESTRIL) 40 MG tablet    metFORMIN (GLUCOPHAGE) 500 MG tablet         Plan:       Problem List Items Addressed This Visit          Cardiac/Vascular    Hyperlipidemia    Hypertension       Endocrine    Diabetes mellitus, type 2 - Primary    Relevant Medications    dulaglutide (TRULICITY) 1.5 mg/0.5 mL pen injector    dapagliflozin (FARXIGA) 10 mg tablet    lisinopriL (PRINIVIL,ZESTRIL) 40 MG tablet     metFORMIN (GLUCOPHAGE) 500 MG tablet       No change in medications  Keep checking glucose bid fasting and 2 hours pp  Lifestyle modifications  DASH encouraged and advised to ensure to take meds as prescribed to better control blood pressure.

## 2022-11-16 NOTE — PATIENT INSTRUCTIONS
Pt is advised to monitor and document glucose fasting when you wake up before you eat and 2 hours after meal and bring in meter to next visit.      Ensure to take medications as directed.      Follow diabetic diet as directed.      Work to achieve normal body weight.     Ensure to exercise 4-5 times per week for 20 minutes.  Snacks with 0-5 grams carbs   Hard Boiled Egg   Crystal Light, Vitamin Water, Powerade Zero   Herbal tea, unsweetened   8 oz unsweetened almond milk   2 tsp peanut butter on celery   ½ cup sugar-free Jell-O   1 sugar-free popsicle   Non starchy vegetables such as carrots or celery sticks with lowfat dressing   ½ oz lowfat cheese or string cheese   1 closed handful of nuts or tbsp of seeds, unsalted    Snacks with 15 gram carbs  . 1 small piece of fruit or . banana or . cup light canned fruit  . 3 charline cracker squares  . 3 cups popcorn  . 5 Vanilla Wafers  . 1/2 cup low fat, no added sugar ice cream or frozen yogurt  . 1/2 turkey, ham, or chicken sandwich  . 1.2 cup fruit with 1/2 cup of cottage cheese  . 4-6 unsalted wheat crackers with 1 oz low fat cheese or 1 tbsp peanut butter  . 30 goldfish crackers  . 7-8 mini rice cakes  . 1/3 cup hummus dip with raw vegetables  . 1/2 whole wheat dalton, 1 tbsp hummus  . Mini pizza (. whole wheat English muffin, low-fat cheese, tomato sauce)  . 100 calorie snack pack  . 4-6 oz light yogurt  . 1/2 cup sugar-free pudding

## 2023-02-09 DIAGNOSIS — Z71.89 COMPLEX CARE COORDINATION: ICD-10-CM

## 2023-03-13 ENCOUNTER — OFFICE VISIT (OUTPATIENT)
Dept: DIABETES SERVICES | Facility: CLINIC | Age: 53
End: 2023-03-13
Payer: MEDICARE

## 2023-03-13 VITALS
BODY MASS INDEX: 37.21 KG/M2 | HEIGHT: 62 IN | SYSTOLIC BLOOD PRESSURE: 120 MMHG | WEIGHT: 202.19 LBS | RESPIRATION RATE: 16 BRPM | HEART RATE: 108 BPM | OXYGEN SATURATION: 99 % | DIASTOLIC BLOOD PRESSURE: 84 MMHG

## 2023-03-13 DIAGNOSIS — I10 PRIMARY HYPERTENSION: ICD-10-CM

## 2023-03-13 DIAGNOSIS — E78.5 HYPERLIPIDEMIA, UNSPECIFIED HYPERLIPIDEMIA TYPE: ICD-10-CM

## 2023-03-13 DIAGNOSIS — E11.9 TYPE 2 DIABETES MELLITUS WITHOUT COMPLICATION, WITHOUT LONG-TERM CURRENT USE OF INSULIN: Primary | ICD-10-CM

## 2023-03-13 LAB
GLUCOSE SERPL-MCNC: 168 MG/DL (ref 70–110)
HBA1C MFR BLD: 6.2 % (ref 4.5–6.6)

## 2023-03-13 PROCEDURE — 99214 OFFICE O/P EST MOD 30 MIN: CPT | Mod: S$PBB,,, | Performed by: NURSE PRACTITIONER

## 2023-03-13 PROCEDURE — 99214 OFFICE O/P EST MOD 30 MIN: CPT | Mod: PBBFAC | Performed by: NURSE PRACTITIONER

## 2023-03-13 PROCEDURE — 83036 HEMOGLOBIN GLYCOSYLATED A1C: CPT | Mod: PBBFAC | Performed by: NURSE PRACTITIONER

## 2023-03-13 PROCEDURE — 82962 GLUCOSE BLOOD TEST: CPT | Mod: PBBFAC | Performed by: NURSE PRACTITIONER

## 2023-03-13 PROCEDURE — 99214 PR OFFICE/OUTPT VISIT, EST, LEVL IV, 30-39 MIN: ICD-10-PCS | Mod: S$PBB,,, | Performed by: NURSE PRACTITIONER

## 2023-03-13 RX ORDER — DAPAGLIFLOZIN 10 MG/1
10 TABLET, FILM COATED ORAL DAILY
Qty: 90 TABLET | Refills: 1 | Status: SHIPPED | OUTPATIENT
Start: 2023-03-13 | End: 2023-06-28 | Stop reason: SDUPTHER

## 2023-03-13 NOTE — PATIENT INSTRUCTIONS
Ibuprofen 200mg take 3 tablets 3 times a day for 1 week, then follow up with primary care if not better.      Low Carbohydrate snacks/quick meals    Ham and cheese rollups - slice of ham wrapped around a string cheese/cheese slice. (0 gm carb)  Cucumber boats (stuffed with chicken salad or tuna salad - no fruit or sweet pickle in salad) (0 gm carb)  Celery and Peanut Butter (2 stalks with 1 Tbsp PNB = 6 gm carb)  Nuts - mixed (1/4 cup = 6 gm carb)  Sunflower seeds- without shell (1/4 cup = 7 gm carb) In the shell (1 cup = 3.3 gm carb)  Deviled eggs (no sweet pickles) - (0 gm carb)  Hard boiled eggs - (0 gm carb)  Guacamole with raw vegetables (mashed avocado with lime juice and seasoning to taste) (1 cup raw veg = 5 gm carb)  Ranch dressing with raw vegetables -(2 Tbsp Ranch = 2 gm carb, ½ cup raw veggies = 2.5 gm carb  Lasagna rolls- Slice zucchini thinly lengthwise and microwave for 1-2 minutes. Fill with ricotta, parmesan cheese. Roll and top with low carb tomato sauce. (5 rolls = 5 gm carb)  Crust less pizza -pepperoni or [place] lua topped with cheese and veggies +1 tbsp tomato sauce, microwave (1 gm carb)  Beef jerky - read label for lower carb jerky  Olives - Black (5 olives = 1 gm carb) Green (5 olives = 1 gm carb)  Dill pickles (1 whole dill pickle = 2 gm carb)  Sugar free jell-o (0 gm carb)  [place] Chicken Marinate chicken strips in 2 Tbsp sugar free maple syrup, 1 Tbsp lime juice, 1 Tbsp fresh cilantro. [place] or cook in skillet sprayed with oil. (0 gm carb)  Chicken nachos - Heat oven to 350. Rub 5-6 chicken tenders with 1 Tbsp Aleksey Taco seasoning then drizzle with vegetable oil. Bake at 350 for 3-4 min and then flip and cook 3-4 min. Top with grated cheese, jalopenos, lua, green onion. Place back in oven at temp of 425 for 3-4 minutes. Serve with side of 2 Tbsp ranch dressing or sour cream. (3 gm carb)  Egg Mcmuffin: using egg (or egg white for a healthier version) as the bread put one slice of  cheese with 1 slice [place] lua or sausage teresita (1 gm carb per sandwich)  Southern Okra - Wash and dry fresh okra. Toss with olive oil, salt and pepper and roast in oven 10-20 minutes. (1 cup = 5 gm carb)  Crispy green beans - [place] 5 lbs fresh green beans. Toss with 1/3 cup melted coconut oil and sprinkle with 4 tsp salt and 1 tsp onion and garlic powder. Bake at 170-175 for 8 hours or dehydrate overnight in . (1 cup = 5 gm carb)  Salt and vinegar zucchini chips - Thinly slice zucchini as thin as possible. In small bowl whisk 2 Tbsp olive oil, 2 Tbsp white vinegar, and 2 tsp sea salt. Add zucchini and dehydrate or bake on 170 for 3-4 hours till crispy. (½ cup = 3 gm carb). Make in large batches and keep in air tight container up to 1 week   Zucchini Aleksey chips - Thinly slice zucchini as thin as possible. Heat oil in fryer to 350 and drop zucchini in hot oil working in batches of about 20 chips at a time. Remove, drain and sprinkle with Aleksey Taco seasoning. (1/2 cup = 3 gm carb). Make in large batches and keep in air tight container up to 1 week.  Aleksey Taco Seasoning - 2 Tbsp chili powder, ½ tsp garlic powder, ½ tsp onion powder, ½ tsp crushed red pepper flakes, ½ tsp dried oregano, 3 tsp ground cumin, 2 tsp sea salt, 2 tsp black pepper. Makes 20 servings (0 gm carb)  [place] milk (1 cup almond milk = 0.3 gm carb)  Cheese chips - Preheat oven 400. On a nonstick baking sheet add cheese slices (Cheddar, Swiss, Provolone, Renato Bebeto), bake 10-12 minutes or until browned. Cool completely before removal. (2 slices = 1 gm carb). Store in air tight container up to 1 week.   Breakfast balls - mix together 2 lbs pork sausage, 1 lb ground beef, 3 eggs, 2 Tbsp dried onion flakes, ½ tsp black pepper, ½ lb sharp cheddar cheese, shredded. Form into 4 dozen 1 balls. Bake on cookie sheet for 25 min at 375. Cool and may be frozen as well individually. (0 gm carb)  Meat muffins - spray muffin tin with cooking spray.  Line muffin tin with 1 slice ham. Add 1 raw egg. Top with grated cheese and salt and pepper. Bake 10-12 min. (0 gm carb)  Pork rinds/Cracklings (0 gm carb)  Gummy Bears - Add 1 packet of Sugar free jello (flavor of your choice), 1 packet unflavored gelatin and 1/3 cup cold water to small sauce pan. Stir well and heat over low till it become liquid and dissolved (2-3 minutes). Pour into mold and refrigerate 30-40 minutes. Add only ¼ cup if you want them more firm. (0 gm carb)  Cheese and meat kabobs (0 gm carb)  Garlic parmesan cheese crisps - Preheat oven to 350. On a nonstick baking sheet, place 1 Tbsp grated parmesan cheese, sprinkle with garlic and basil. Bake about 5 minutes or until outside edges become ordoñez brown. Cool completely before removal (5 crisps = 1 gm carb)  Antipasti - Pepperoni, salami, green pepper, jalapeno pepper, mushrooms, onion, black olives, cheddar and provolone cheese cubes. Toss with Italian salad dressing. (1/2 cup = 5 gm carb)  [place] chicken wings - (0 gm carb)  Cheetos- preheat oven to 300. Chop ½ cup freshly shredded cheddar cheese that is frozen and place in  or  and chop into tiny pieces. In a mixing bowl, whip 3 egg whites and 1/8 tsp cream of tartar until VERY stiff peaks form. Sprinkle cheese on top of egg whites and then fold cheese into egg whites very carefully. Place mixture into large ziplock bag and cut hole in corner. Gently squeeze onto greased nonstick cookie pan in cheestos like shapes. Sprinkle with parmesan cheese. Bake for 30 minutes. Turn over off and leave in there for another 30 minutes. (1 cup = 1 gm carb)  Cheesy cauliflower tots - preheat oven to 400. Spray mini muffin tin with nonstick spray. Chop ½ large head of cauliflower into small pieces. Place in microwavable bowl and cover. Microwave 2 minutes. Drain cauliflower. Place in  and pulse till finely chopped. To this add, 1/3 cup grated sharp cheddar, ¼ cup grated  parmesan cheese, 2 Tbsp. almond flour, ¼ tsp salt, ½ tsp all purpose seasoning and 1 egg. Mix well. Place 1 Tbsp of mixture in each mini muffin tin. Bake 15 minutes. Remove and flip, bake another 15 minutes. Makes 24 tots. (10 tots = 5 gm carb)  Quest protein bars (carbs vary)  Bebeto snacks - Preheat oven 350. 1 cup shredded Pepperjack paper. Scoop 1 Tbsp cheese. Place on non stick pan and press slightly flat. Top with 1 slice jalapeno pepper. Bake for 10-12 minutes till brown and crispy. Cool completely before removal. (10 crisps = 1 gm carb)  Snake bite (aka jalapeno poppers) - remove seeds and membrane from jalapenos, fill with cream cheese, wrap in lua. Stick a toothpick through to hold lua in place. Bake 15-20 min at 400 (4 bites = 2 gm carb)  5 minute PNB mousse - Beat together 4 oz softened cream cheese, 2 Tbsp natural PNB (no sugar added), ½ tsp vanilla extract and 1/3 cup Splenda. Fold in 1 cup Reddiwhip. Place in container of your choice and refrigerate up to 1 week. Top your serving with 1Tbsp Sugar free chocolate syrup. (1/2 cup = 4 gm carb)  BLT - Fill a leaf of clementine lettuce leaf with 1 Tbsp LF acosta, 2 slices lua, sliced tomato. Sprinkle salt and pepper. (0 gm carb)  Cinnamon and coconut bombs - in microwave safe bowl, mix 1 cup coconut butter, 1 cup coconut milk (full fat canned, not from box), 1 tsp vanilla extract, ½ tsp nutmeg and cinnamon, 1 tsp stevia powder extract. Melt until combined. Place bowl in fridge until hard enough to roll into 10-12 balls, about 30 minutes. Roll balls in 1 cup shredded coconut. Store in refrigerator (1 ball = 1 gm carb)

## 2023-03-13 NOTE — PROGRESS NOTES
Subjective:       Patient ID: Alley Banks is a 52 y.o. female.    Chief Complaint: General Diabetes Follow-up (Here for fu and A1c checks sugar 2 times daily  c/o bilateral leg pain and numbness)    Here today for routine evaluation and med refill.  Her only complaint today is arthritis pain in left hip and knee.  Denies injury, ambulating without difficulty.      Hemoglobin A1C       Date                     Value               Ref Range           Status                03/13/2023               6.2                 4.5 - 6.6 %         Final                 11/16/2022               5.8                 4.5 - 6.6 %         Final                 08/15/2022               7.9 (A)             4.5 - 6.6 %         Final                 02/08/2022               9.1 (H)             4.5 - 6.6 %         Final                Lab Results       Component                Value               Date                       MICROALBUR               5.8 (H)             04/11/2022            Lab Results       Component                Value               Date                       CHOL                     256 (H)             02/08/2022            Lab Results       Component                Value               Date                       HDL                      54                  02/08/2022            Lab Results       Component                Value               Date                       LDLCALC                  146                 02/08/2022            Lab Results       Component                Value               Date                       TRIG                     280 (H)             02/08/2022            Lab Results       Component                Value               Date                       CHOLHDL                  4.7                 02/08/2022            CMP  Sodium       Date                     Value               Ref Range           Status                02/08/2022               133 (L)             136 - 145 mmol*     Final             ----------  Potassium       Date                     Value               Ref Range           Status                02/08/2022               4.6                 3.5 - 5.1 mmol*     Final            ----------  Chloride       Date                     Value               Ref Range           Status                02/08/2022               98                  98 - 107 mmol/L     Final            ----------  CO2       Date                     Value               Ref Range           Status                02/08/2022               27                  21 - 32 mmol/L      Final            ----------  Glucose       Date                     Value               Ref Range           Status                02/08/2022               334 (H)             74 - 106 mg/dL      Final            ----------  BUN       Date                     Value               Ref Range           Status                02/08/2022               20 (H)              7 - 18 mg/dL        Final            ----------  Creatinine       Date                     Value               Ref Range           Status                02/08/2022               0.93                0.55 - 1.02 mg*     Final            ----------  Calcium       Date                     Value               Ref Range           Status                02/08/2022               10.2 (H)            8.5 - 10.1 mg/*     Final            ----------  Total Protein       Date                     Value               Ref Range           Status                02/08/2022               8.4 (H)             6.4 - 8.2 g/dL      Final            ----------  Albumin       Date                     Value               Ref Range           Status                02/08/2022               3.5                 3.5 - 5.0 g/dL      Final            ----------  Bilirubin, Total       Date                     Value               Ref Range           Status                02/08/2022               0.2                 0.0 - 1.2 mg/dL      Final            ----------  Alk Phos       Date                     Value               Ref Range           Status                02/08/2022               95                  41 - 108 U/L        Final            ----------  AST       Date                     Value               Ref Range           Status                02/08/2022               21                  15 - 37 U/L         Final            ----------  ALT       Date                     Value               Ref Range           Status                02/08/2022               27                  13 - 56 U/L         Final            ----------  Anion Gap       Date                     Value               Ref Range           Status                02/08/2022               13                  7 - 16 mmol/L       Final            ----------    Review of Systems   Constitutional:  Negative for activity change, appetite change, diaphoresis and fatigue.   HENT:  Negative for nasal congestion, facial swelling and sinus pressure/congestion.    Eyes:  Negative for visual disturbance.   Respiratory:  Negative for shortness of breath and wheezing.    Cardiovascular:  Negative for chest pain and leg swelling.   Gastrointestinal:  Negative for constipation, diarrhea, nausea and vomiting.   Endocrine: Negative for polydipsia, polyphagia and polyuria.   Genitourinary:  Negative for dysuria, frequency and urgency.   Musculoskeletal:  Negative for gait problem and myalgias.   Integumentary:  Negative for color change, rash and wound.   Neurological:  Negative for dizziness, syncope, weakness, headaches, coordination difficulties and coordination difficulties.   Hematological:  Does not bruise/bleed easily.   Psychiatric/Behavioral:  Negative for self-injury, sleep disturbance and suicidal ideas. The patient is not nervous/anxious.        Objective:      Physical Exam  Vitals and nursing note reviewed.   Constitutional:       Appearance: Normal appearance.   HENT:      Head:  Normocephalic.   Neck:      Thyroid: No thyromegaly.      Vascular: No carotid bruit.   Cardiovascular:      Rate and Rhythm: Normal rate and regular rhythm.      Pulses:           Dorsalis pedis pulses are 2+ on the right side and 2+ on the left side.        Posterior tibial pulses are 2+ on the right side and 2+ on the left side.      Heart sounds: Normal heart sounds.   Pulmonary:      Effort: Pulmonary effort is normal.      Breath sounds: Normal breath sounds.   Musculoskeletal:         General: Normal range of motion.      Right foot: Normal range of motion.      Left foot: Normal range of motion.   Feet:      Right foot:      Protective Sensation: 6 sites tested.  6 sites sensed.      Skin integrity: Callus and dry skin present.      Toenail Condition: Fungal disease present.     Left foot:      Protective Sensation: 6 sites tested.  6 sites sensed.      Skin integrity: Callus and dry skin present.      Toenail Condition: Fungal disease present.  Skin:     General: Skin is warm and dry.   Neurological:      General: No focal deficit present.      Mental Status: She is alert and oriented to person, place, and time.   Psychiatric:         Mood and Affect: Mood normal.         Behavior: Behavior normal.         Thought Content: Thought content normal.         Judgment: Judgment normal.       Assessment:       Problem List Items Addressed This Visit          Cardiac/Vascular    Hyperlipidemia    Hypertension       Endocrine    Diabetes mellitus, type 2 - Primary    Relevant Medications    dapagliflozin (FARXIGA) 10 mg tablet    Other Relevant Orders    Hemoglobin A1C, POCT (Completed)    POCT Glucose, Hand-Held Device (Completed)         Plan:       1. Type 2 diabetes mellitus without complication, without long-term current use of insulin  No change in DM meds  Continue with lifestyle modifications  -     Hemoglobin A1C, POCT  -     POCT Glucose, Hand-Held Device  -     dapagliflozin (FARXIGA) 10 mg tablet; Take  1 tablet (10 mg total) by mouth once daily.  Dispense: 90 tablet; Refill: 1    2. Primary hypertension  ACE-lisinopril  Continue norvasc    3. Hyperlipidemia, unspecified hyperlipidemia type  Statin-crestor    4. Arthritic pain in left hip and left knee  Take motrin otc as on AVS and follow up in 1 week with pcp if not better.

## 2023-06-06 ENCOUNTER — PATIENT OUTREACH (OUTPATIENT)
Dept: ADMINISTRATIVE | Facility: HOSPITAL | Age: 53
End: 2023-06-06

## 2023-06-06 NOTE — PROGRESS NOTES
Spoke with pt about her HM care issues. She said she is no longer seeing LEX Truong. I updated her chart and stressed the importance of keeping her appointment. She verbalized understanding

## 2023-06-22 NOTE — PROGRESS NOTES
Subjective:       Patient ID: Alley Banks is a 53 y.o. female.    Chief Complaint: General Diabetes Follow-up (Follow up. Checks blood sugars daily, states that they have been looking good. No questions or concerns today. Overall feeling good)    Here today for routine evaluation and med refill.  She has done very well. Denies complaints today.     Lab Results       Component                Value               Date                       HGBA1C                   6.2                 03/13/2023            Lab Results       Component                Value               Date                       MICROALBUR               5.8 (H)             04/11/2022            Lab Results       Component                Value               Date                       CHOL                     256 (H)             02/08/2022            Lab Results       Component                Value               Date                       HDL                      54                  02/08/2022            Lab Results       Component                Value               Date                       LDLCALC                  146                 02/08/2022            No results found for: DLDL  Lab Results       Component                Value               Date                       TRIG                     280 (H)             02/08/2022              f1 Lab Results       Component                Value               Date                       CHOLHDL                  4.7                 02/08/2022            CMP  Sodium       Date                     Value               Ref Range           Status                02/08/2022               133 (L)             136 - 145 mmol*     Final            ----------  Potassium       Date                     Value               Ref Range           Status                02/08/2022               4.6                 3.5 - 5.1 mmol*     Final            ----------  Chloride       Date                     Value               Ref  Range           Status                02/08/2022               98                  98 - 107 mmol/L     Final            ----------  CO2       Date                     Value               Ref Range           Status                02/08/2022               27                  21 - 32 mmol/L      Final            ----------  Glucose       Date                     Value               Ref Range           Status                02/08/2022               334 (H)             74 - 106 mg/dL      Final            ----------  BUN       Date                     Value               Ref Range           Status                02/08/2022               20 (H)              7 - 18 mg/dL        Final            ----------  Creatinine       Date                     Value               Ref Range           Status                02/08/2022               0.93                0.55 - 1.02 mg*     Final            ----------  Calcium       Date                     Value               Ref Range           Status                02/08/2022               10.2 (H)            8.5 - 10.1 mg/*     Final            ----------  Total Protein       Date                     Value               Ref Range           Status                02/08/2022               8.4 (H)             6.4 - 8.2 g/dL      Final            ----------  Albumin       Date                     Value               Ref Range           Status                02/08/2022               3.5                 3.5 - 5.0 g/dL      Final            ----------  Bilirubin, Total       Date                     Value               Ref Range           Status                02/08/2022               0.2                 0.0 - 1.2 mg/dL     Final            ----------  Alk Phos       Date                     Value               Ref Range           Status                02/08/2022               95                  41 - 108 U/L        Final            ----------  AST       Date                     Value                Ref Range           Status                02/08/2022               21                  15 - 37 U/L         Final            ----------  ALT       Date                     Value               Ref Range           Status                02/08/2022               27                  13 - 56 U/L         Final            ----------  Anion Gap       Date                     Value               Ref Range           Status                02/08/2022               13                  7 - 16 mmol/L       Final            ----------    Review of Systems   Constitutional:  Negative for activity change, appetite change, diaphoresis and fatigue.   HENT:  Negative for nasal congestion, facial swelling and sinus pressure/congestion.    Eyes:  Negative for visual disturbance.   Respiratory:  Negative for shortness of breath and wheezing.    Cardiovascular:  Negative for chest pain and leg swelling.   Gastrointestinal:  Negative for constipation, diarrhea, nausea and vomiting.   Endocrine: Negative for polydipsia, polyphagia and polyuria.   Genitourinary:  Negative for dysuria, frequency and urgency.   Musculoskeletal:  Negative for gait problem and myalgias.   Integumentary:  Negative for color change, rash and wound.   Neurological:  Negative for dizziness, syncope, weakness, headaches, coordination difficulties and coordination difficulties.   Hematological:  Does not bruise/bleed easily.   Psychiatric/Behavioral:  Negative for self-injury, sleep disturbance and suicidal ideas. The patient is not nervous/anxious.        Objective:      Physical Exam  Vitals and nursing note reviewed.   Constitutional:       Appearance: Normal appearance.   HENT:      Head: Normocephalic.   Neck:      Thyroid: No thyromegaly.      Vascular: No carotid bruit.   Cardiovascular:      Rate and Rhythm: Normal rate and regular rhythm.      Pulses:           Dorsalis pedis pulses are 2+ on the right side and 2+ on the left side.        Posterior tibial  pulses are 2+ on the right side and 2+ on the left side.      Heart sounds: Normal heart sounds.   Pulmonary:      Effort: Pulmonary effort is normal.      Breath sounds: Normal breath sounds.   Musculoskeletal:         General: Normal range of motion.      Right foot: Normal range of motion.      Left foot: Normal range of motion.   Feet:      Right foot:      Protective Sensation: 6 sites tested.  6 sites sensed.      Skin integrity: Callus and dry skin present.      Toenail Condition: Fungal disease present.     Left foot:      Protective Sensation: 6 sites tested.  6 sites sensed.      Skin integrity: Callus and dry skin present.      Toenail Condition: Fungal disease present.  Skin:     General: Skin is warm and dry.   Neurological:      General: No focal deficit present.      Mental Status: She is alert and oriented to person, place, and time.   Psychiatric:         Mood and Affect: Mood normal.         Behavior: Behavior normal.         Thought Content: Thought content normal.         Judgment: Judgment normal.       Assessment:       Problem List Items Addressed This Visit          Cardiac/Vascular    Hyperlipidemia    Hypertension       Endocrine    Diabetes mellitus, type 2 - Primary    Relevant Medications    dapagliflozin propanediol (FARXIGA) 10 mg tablet    dulaglutide (TRULICITY) 1.5 mg/0.5 mL pen injector    metFORMIN (GLUCOPHAGE) 500 MG tablet    lisinopriL (PRINIVIL,ZESTRIL) 40 MG tablet    Other Relevant Orders    Hemoglobin A1C, POCT (Completed)    POCT Glucose, Hand-Held Device (Completed)         Plan:       1. Type 2 diabetes mellitus without complication, without long-term current use of insulin  No change in DM meds  Continue with lifestyle modifications  -     Hemoglobin A1C, POCT  -     POCT Glucose, Hand-Held Device  -     dapagliflozin (FARXIGA) 10 mg tablet; Take 1 tablet (10 mg total) by mouth once daily.  Dispense: 90 tablet; Refill: 1    2. Primary  hypertension  ACE-lisinopril  Continue norvasc    3. Hyperlipidemia, unspecified hyperlipidemia type  Statin-crestor    4. Arthritic pain in left hip and left knee  Take motrin otc as on AVS and follow up in 1 week with pcp if not better.

## 2023-06-28 ENCOUNTER — OFFICE VISIT (OUTPATIENT)
Dept: DIABETES SERVICES | Facility: CLINIC | Age: 53
End: 2023-06-28
Payer: MEDICARE

## 2023-06-28 VITALS
HEART RATE: 76 BPM | BODY MASS INDEX: 38.09 KG/M2 | OXYGEN SATURATION: 98 % | RESPIRATION RATE: 16 BRPM | DIASTOLIC BLOOD PRESSURE: 88 MMHG | SYSTOLIC BLOOD PRESSURE: 138 MMHG | HEIGHT: 62 IN | WEIGHT: 207 LBS

## 2023-06-28 DIAGNOSIS — E11.9 TYPE 2 DIABETES MELLITUS WITHOUT COMPLICATION, WITHOUT LONG-TERM CURRENT USE OF INSULIN: Primary | ICD-10-CM

## 2023-06-28 DIAGNOSIS — E78.5 HYPERLIPIDEMIA, UNSPECIFIED HYPERLIPIDEMIA TYPE: ICD-10-CM

## 2023-06-28 DIAGNOSIS — I10 PRIMARY HYPERTENSION: ICD-10-CM

## 2023-06-28 LAB
GLUCOSE SERPL-MCNC: 145 MG/DL (ref 70–110)
HBA1C MFR BLD: 6.2 % (ref 4.5–6.6)

## 2023-06-28 PROCEDURE — 82962 GLUCOSE BLOOD TEST: CPT | Mod: PBBFAC | Performed by: NURSE PRACTITIONER

## 2023-06-28 PROCEDURE — 99214 PR OFFICE/OUTPT VISIT, EST, LEVL IV, 30-39 MIN: ICD-10-PCS | Mod: S$PBB,,, | Performed by: NURSE PRACTITIONER

## 2023-06-28 PROCEDURE — 99214 OFFICE O/P EST MOD 30 MIN: CPT | Mod: S$PBB,,, | Performed by: NURSE PRACTITIONER

## 2023-06-28 PROCEDURE — 99214 OFFICE O/P EST MOD 30 MIN: CPT | Mod: PBBFAC | Performed by: NURSE PRACTITIONER

## 2023-06-28 PROCEDURE — 83036 HEMOGLOBIN GLYCOSYLATED A1C: CPT | Mod: PBBFAC | Performed by: NURSE PRACTITIONER

## 2023-06-28 RX ORDER — DULAGLUTIDE 1.5 MG/.5ML
1.5 INJECTION, SOLUTION SUBCUTANEOUS
Qty: 12 PEN | Refills: 1 | Status: SHIPPED | OUTPATIENT
Start: 2023-06-28 | End: 2024-04-02 | Stop reason: SDUPTHER

## 2023-06-28 RX ORDER — ROSUVASTATIN CALCIUM 20 MG/1
20 TABLET, COATED ORAL DAILY
Qty: 90 TABLET | Refills: 1 | Status: SHIPPED | OUTPATIENT
Start: 2023-06-28 | End: 2023-07-06 | Stop reason: SDUPTHER

## 2023-06-28 RX ORDER — DAPAGLIFLOZIN 10 MG/1
10 TABLET, FILM COATED ORAL DAILY
Qty: 90 TABLET | Refills: 1 | Status: SHIPPED | OUTPATIENT
Start: 2023-06-28 | End: 2023-10-26 | Stop reason: SDUPTHER

## 2023-06-28 RX ORDER — METFORMIN HYDROCHLORIDE 500 MG/1
1000 TABLET ORAL 2 TIMES DAILY WITH MEALS
Qty: 360 TABLET | Refills: 1 | Status: SHIPPED | OUTPATIENT
Start: 2023-06-28 | End: 2023-10-26 | Stop reason: SDUPTHER

## 2023-06-28 RX ORDER — AMLODIPINE BESYLATE 10 MG/1
10 TABLET ORAL DAILY
Qty: 90 TABLET | Refills: 1 | Status: SHIPPED | OUTPATIENT
Start: 2023-06-28 | End: 2023-10-26 | Stop reason: SDUPTHER

## 2023-06-28 RX ORDER — LISINOPRIL 40 MG/1
40 TABLET ORAL DAILY
Qty: 90 TABLET | Refills: 1 | Status: SHIPPED | OUTPATIENT
Start: 2023-06-28 | End: 2023-10-26 | Stop reason: SDUPTHER

## 2023-06-28 NOTE — PATIENT INSTRUCTIONS
Monitor and document glucose daily alternating between fasting and two hours pp and bring in meter to next visit.    Exercise 4-5 times per week.    Work to obtain normal weight.   Take all medications as directed.      Low Carbohydrate snacks/quick meals    Ham and cheese rollups - slice of ham wrapped around a string cheese/cheese slice. (0 gm carb)  Cucumber boats (stuffed with chicken salad or tuna salad - no fruit or sweet pickle in salad) (0 gm carb)  Celery and Peanut Butter (2 stalks with 1 Tbsp PNB = 6 gm carb)  Nuts - mixed (1/4 cup = 6 gm carb)  Sunflower seeds- without shell (1/4 cup = 7 gm carb) In the shell (1 cup = 3.3 gm carb)  Deviled eggs (no sweet pickles) - (0 gm carb)  Hard boiled eggs - (0 gm carb)  Guacamole with raw vegetables (mashed avocado with lime juice and seasoning to taste) (1 cup raw veg = 5 gm carb)  Ranch dressing with raw vegetables -(2 Tbsp Ranch = 2 gm carb, ½ cup raw veggies = 2.5 gm carb  Lasagna rolls- Slice zucchini thinly lengthwise and microwave for 1-2 minutes. Fill with ricotta, parmesan cheese. Roll and top with low carb tomato sauce. (5 rolls = 5 gm carb)  Crust less pizza -pepperoni or [place] lua topped with cheese and veggies +1 tbsp tomato sauce, microwave (1 gm carb)  Beef jerky - read label for lower carb jerky  Olives - Black (5 olives = 1 gm carb) Green (5 olives = 1 gm carb)  Dill pickles (1 whole dill pickle = 2 gm carb)  Sugar free jell-o (0 gm carb)  [place] Chicken Marinate chicken strips in 2 Tbsp sugar free maple syrup, 1 Tbsp lime juice, 1 Tbsp fresh cilantro. [employer] or cook in skillet sprayed with oil. (0 gm carb)  Chicken nachos - Heat oven to 350. Rub 5-6 chicken tenders with 1 Tbsp Aleksey Taco seasoning then drizzle with vegetable oil. Bake at 350 for 3-4 min and then flip and cook 3-4 min. Top with grated cheese, jalopenos, lua, green onion. Place back in oven at temp of 425 for 3-4 minutes. Serve with side of 2 Tbsp ranch dressing or sour  cream. (3 gm carb)  Egg Mcmuffin: using egg (or egg white for a healthier version) as the bread put one slice of cheese with 1 slice [place] lua or sausage teresita (1 gm carb per sandwich)  Southern Okra - Wash and dry fresh okra. Toss with olive oil, salt and pepper and roast in oven 10-20 minutes. (1 cup = 5 gm carb)  Crispy green beans - [place] 5 lbs fresh green beans. Toss with 1/3 cup melted coconut oil and sprinkle with 4 tsp salt and 1 tsp onion and garlic powder. Bake at 170-175 for 8 hours or dehydrate overnight in . (1 cup = 5 gm carb)  Salt and vinegar zucchini chips - Thinly slice zucchini as thin as possible. In small bowl whisk 2 Tbsp olive oil, 2 Tbsp white vinegar, and 2 tsp sea salt. Add zucchini and dehydrate or bake on 170 for 3-4 hours till crispy. (½ cup = 3 gm carb). Make in large batches and keep in air tight container up to 1 week   Zucchini Aleksey chips - Thinly slice zucchini as thin as possible. Heat oil in fryer to 350 and drop zucchini in hot oil working in batches of about 20 chips at a time. Remove, drain and sprinkle with Aleksey Taco seasoning. (1/2 cup = 3 gm carb). Make in large batches and keep in air tight container up to 1 week.  Aleksey Taco Seasoning - 2 Tbsp chili powder, ½ tsp garlic powder, ½ tsp onion powder, ½ tsp crushed red pepper flakes, ½ tsp dried oregano, 3 tsp ground cumin, 2 tsp sea salt, 2 tsp black pepper. Makes 20 servings (0 gm carb)  [place] milk (1 cup almond milk = 0.3 gm carb)  Cheese chips - Preheat oven 400. On a nonstick baking sheet add cheese slices (Cheddar, Swiss, Provolone, Renato Bebeto), bake 10-12 minutes or until browned. Cool completely before removal. (2 slices = 1 gm carb). Store in air tight container up to 1 week.   Breakfast balls - mix together 2 lbs pork sausage, 1 lb ground beef, 3 eggs, 2 Tbsp dried onion flakes, ½ tsp black pepper, ½ lb sharp cheddar cheese, shredded. Form into 4 dozen 1 balls. Bake on cookie sheet for 25 min at  375. Cool and may be frozen as well individually. (0 gm carb)  Meat muffins - spray muffin tin with cooking spray. Line muffin tin with 1 slice ham. Add 1 raw egg. Top with grated cheese and salt and pepper. Bake 10-12 min. (0 gm carb)  Pork rinds/Cracklings (0 gm carb)  Gummy Bears - Add 1 packet of Sugar free jello (flavor of your choice), 1 packet unflavored gelatin and 1/3 cup cold water to small sauce pan. Stir well and heat over low till it become liquid and dissolved (2-3 minutes). Pour into mold and refrigerate 30-40 minutes. Add only ¼ cup if you want them more firm. (0 gm carb)  Cheese and meat kabobs (0 gm carb)  Garlic parmesan cheese crisps - Preheat oven to 350. On a nonstick baking sheet, place 1 Tbsp grated parmesan cheese, sprinkle with garlic and basil. Bake about 5 minutes or until outside edges become ordoñez brown. Cool completely before removal (5 crisps = 1 gm carb)  Antipasti - Pepperoni, salami, green pepper, jalapeno pepper, mushrooms, onion, black olives, cheddar and provolone cheese cubes. Toss with Italian salad dressing. (1/2 cup = 5 gm carb)  [place] chicken wings - (0 gm carb)  Cheetos- preheat oven to 300. Chop ½ cup freshly shredded cheddar cheese that is frozen and place in  or  and chop into tiny pieces. In a mixing bowl, whip 3 egg whites and 1/8 tsp cream of tartar until VERY stiff peaks form. Sprinkle cheese on top of egg whites and then fold cheese into egg whites very carefully. Place mixture into large ziplock bag and cut hole in corner. Gently squeeze onto greased nonstick cookie pan in cheestos like shapes. Sprinkle with parmesan cheese. Bake for 30 minutes. Turn over off and leave in there for another 30 minutes. (1 cup = 1 gm carb)  Cheesy cauliflower tots - preheat oven to 400. Spray mini muffin tin with nonstick spray. Chop ½ large head of cauliflower into small pieces. Place in microwavable bowl and cover. Microwave 2 minutes. Drain cauliflower.  Place in  and pulse till finely chopped. To this add, 1/3 cup grated sharp cheddar, ¼ cup grated parmesan cheese, 2 Tbsp. almond flour, ¼ tsp salt, ½ tsp all purpose seasoning and 1 egg. Mix well. Place 1 Tbsp of mixture in each mini muffin tin. Bake 15 minutes. Remove and flip, bake another 15 minutes. Makes 24 tots. (10 tots = 5 gm carb)  [facility] protein bars (carbs vary)  Bebeto snacks - Preheat oven 350. 1 cup shredded Pepperjack paper. Scoop 1 Tbsp cheese. Place on non stick pan and press slightly flat. Top with 1 slice jalapeno pepper. Bake for 10-12 minutes till brown and crispy. Cool completely before removal. (10 crisps = 1 gm carb)  Snake bite (aka jalapeno poppers) - remove seeds and membrane from jalapenos, fill with cream cheese, wrap in lua. Stick a toothpick through to hold lua in place. Bake 15-20 min at 400 (4 bites = 2 gm carb)  5 minute PNB mousse - Beat together 4 oz softened cream cheese, 2 Tbsp natural PNB (no sugar added), ½ tsp vanilla extract and 1/3 cup Splenda. Fold in 1 cup Reddiwhip. Place in container of your choice and refrigerate up to 1 week. Top your serving with 1Tbsp Sugar free chocolate syrup. (1/2 cup = 4 gm carb)  BLT - Fill a leaf of clementine lettuce leaf with 1 Tbsp LF acosta, 2 slices lua, sliced tomato. Sprinkle salt and pepper. (0 gm carb)  Cinnamon and coconut bombs - in microwave safe bowl, mix 1 cup coconut butter, 1 cup coconut milk (full fat canned, not from box), 1 tsp vanilla extract, ½ tsp nutmeg and cinnamon, 1 tsp stevia powder extract. Melt until combined. Place bowl in fridge until hard enough to roll into 10-12 balls, about 30 minutes. Roll balls in 1 cup shredded coconut. Store in refrigerator (1 ball = 1 gm carb)

## 2023-07-06 RX ORDER — ROSUVASTATIN CALCIUM 20 MG/1
20 TABLET, COATED ORAL DAILY
Qty: 90 TABLET | Refills: 1 | Status: SHIPPED | OUTPATIENT
Start: 2023-07-06 | End: 2023-10-26 | Stop reason: SDUPTHER

## 2023-10-25 NOTE — PROGRESS NOTES
"Subjective:       Patient ID: Alley Banks is a 53 y.o. female.    Chief Complaint: No chief complaint on file.    Here today for 3 month follow up. A1c at last check was well controlled.  Currently taking farxiga, trulicity and metformin  Plan at last visit:  No changes in DM meds.    6/28/23: Here today for routine evaluation and med refill.  She has done very well. Denies complaints today.     Lab Results       Component                Value               Date                       HGBA1C                   6.2                 03/13/2023            Lab Results       Component                Value               Date                       MICROALBUR               8.3 (H)             06/28/2023            Lab Results       Component                Value               Date                       CHOL                     216 (H)             06/28/2023                 CHOL                     256 (H)             02/08/2022            Lab Results       Component                Value               Date                       HDL                      70 (H)              06/28/2023                 HDL                      54                  02/08/2022            Lab Results       Component                Value               Date                       LDLCALC                  123                 06/28/2023                 LDLCALC                  146                 02/08/2022            No results found for: "DLDL"  Lab Results       Component                Value               Date                       TRIG                     116                 06/28/2023                 TRIG                     280 (H)             02/08/2022              f1 Lab Results       Component                Value               Date                       CHOLHDL                  3.1                 06/28/2023                 CHOLHDL                  4.7                 02/08/2022            CMP  Sodium       Date                     Value      "          Ref Range           Status                06/28/2023               135 (L)             136 - 145 mmol*     Final            ----------  Potassium       Date                     Value               Ref Range           Status                06/28/2023               4.4                 3.5 - 5.1 mmol*     Final            ----------  Chloride       Date                     Value               Ref Range           Status                06/28/2023               106                 98 - 107 mmol/L     Final            ----------  CO2       Date                     Value               Ref Range           Status                06/28/2023               27                  21 - 32 mmol/L      Final            ----------  Glucose       Date                     Value               Ref Range           Status                06/28/2023               138 (H)             74 - 106 mg/dL      Final            ----------  BUN       Date                     Value               Ref Range           Status                06/28/2023               14                  7 - 18 mg/dL        Final            ----------  Creatinine       Date                     Value               Ref Range           Status                06/28/2023               0.68                0.55 - 1.02 mg*     Final            ----------  Calcium       Date                     Value               Ref Range           Status                06/28/2023               9.3                 8.5 - 10.1 mg/*     Final            ----------  Total Protein       Date                     Value               Ref Range           Status                06/28/2023               8.0                 6.4 - 8.2 g/dL      Final            ----------  Albumin       Date                     Value               Ref Range           Status                06/28/2023               3.3 (L)             3.5 - 5.0 g/dL      Final            ----------  Bilirubin, Total       Date                      Value               Ref Range           Status                06/28/2023               0.2                 >0.0 - 1.2 mg/*     Final            ----------  Alk Phos       Date                     Value               Ref Range           Status                06/28/2023               71                  41 - 108 U/L        Final            ----------  AST       Date                     Value               Ref Range           Status                06/28/2023               13 (L)              15 - 37 U/L         Final            ----------  ALT       Date                     Value               Ref Range           Status                06/28/2023               16                  13 - 56 U/L         Final            ----------  Anion Gap       Date                     Value               Ref Range           Status                06/28/2023               6 (L)               7 - 16 mmol/L       Final            ----------  eGFR       Date                     Value               Ref Range           Status                06/28/2023               104                 >=60 mL/min/1.*     Final            ----------      Review of Systems   Constitutional:  Negative for activity change, appetite change, diaphoresis and fatigue.   HENT:  Negative for nasal congestion, facial swelling and sinus pressure/congestion.    Eyes:  Negative for visual disturbance.   Respiratory:  Negative for shortness of breath and wheezing.    Cardiovascular:  Negative for chest pain and leg swelling.   Gastrointestinal:  Negative for constipation, diarrhea, nausea and vomiting.   Endocrine: Negative for polydipsia, polyphagia and polyuria.   Genitourinary:  Negative for dysuria, frequency and urgency.   Musculoskeletal:  Negative for gait problem and myalgias.   Integumentary:  Negative for color change, rash and wound.   Neurological:  Negative for dizziness, syncope, weakness, headaches and coordination difficulties.   Hematological:  Does not  bruise/bleed easily.   Psychiatric/Behavioral:  Negative for self-injury, sleep disturbance and suicidal ideas. The patient is not nervous/anxious.          Objective:      Physical Exam  Vitals and nursing note reviewed.   Constitutional:       Appearance: Normal appearance.   HENT:      Head: Normocephalic.   Neck:      Thyroid: No thyromegaly.      Vascular: No carotid bruit.   Cardiovascular:      Rate and Rhythm: Normal rate and regular rhythm.      Pulses:           Dorsalis pedis pulses are 2+ on the right side and 2+ on the left side.        Posterior tibial pulses are 2+ on the right side and 2+ on the left side.      Heart sounds: Normal heart sounds.   Pulmonary:      Effort: Pulmonary effort is normal.      Breath sounds: Normal breath sounds.   Musculoskeletal:         General: Normal range of motion.      Right foot: Normal range of motion.      Left foot: Normal range of motion.   Feet:      Right foot:      Protective Sensation: 6 sites tested.  6 sites sensed.      Skin integrity: Callus and dry skin present.      Toenail Condition: Fungal disease present.     Left foot:      Protective Sensation: 6 sites tested.  6 sites sensed.      Skin integrity: Callus and dry skin present.      Toenail Condition: Fungal disease present.  Skin:     General: Skin is warm and dry.   Neurological:      General: No focal deficit present.      Mental Status: She is alert and oriented to person, place, and time.   Psychiatric:         Mood and Affect: Mood normal.         Behavior: Behavior normal.         Thought Content: Thought content normal.         Judgment: Judgment normal.         Assessment:     1. Type 2 diabetes mellitus without complication, without long-term current use of insulin  No change in meds at this time  Check glucose daily alternating with fasting and 2 hour pp checks  Lifestyle modifications encouraged.       2. Primary hypertension  ACE coverage, well controlled     3. Hyperlipidemia,  unspecified hyperlipidemia type  Statin coverage, on crestor 20mg          Complex Repair Preamble Text (Leave Blank If You Do Not Want): Extensive wide undermining was performed.

## 2023-10-26 ENCOUNTER — OFFICE VISIT (OUTPATIENT)
Dept: DIABETES SERVICES | Facility: CLINIC | Age: 53
End: 2023-10-26
Payer: MEDICARE

## 2023-10-26 VITALS
HEART RATE: 88 BPM | DIASTOLIC BLOOD PRESSURE: 90 MMHG | BODY MASS INDEX: 36.03 KG/M2 | OXYGEN SATURATION: 98 % | HEIGHT: 62 IN | WEIGHT: 195.81 LBS | SYSTOLIC BLOOD PRESSURE: 158 MMHG | RESPIRATION RATE: 16 BRPM

## 2023-10-26 DIAGNOSIS — E78.5 HYPERLIPIDEMIA, UNSPECIFIED HYPERLIPIDEMIA TYPE: ICD-10-CM

## 2023-10-26 DIAGNOSIS — I10 PRIMARY HYPERTENSION: ICD-10-CM

## 2023-10-26 DIAGNOSIS — E11.9 TYPE 2 DIABETES MELLITUS WITHOUT COMPLICATION, WITHOUT LONG-TERM CURRENT USE OF INSULIN: Primary | ICD-10-CM

## 2023-10-26 LAB
GLUCOSE SERPL-MCNC: 137 MG/DL (ref 70–110)
HBA1C MFR BLD: 6 % (ref 4.5–6.6)

## 2023-10-26 PROCEDURE — 99214 OFFICE O/P EST MOD 30 MIN: CPT | Mod: PBBFAC | Performed by: NURSE PRACTITIONER

## 2023-10-26 PROCEDURE — 99214 OFFICE O/P EST MOD 30 MIN: CPT | Mod: S$PBB,,, | Performed by: NURSE PRACTITIONER

## 2023-10-26 PROCEDURE — 99999PBSHW POCT GLUCOSE, HAND-HELD DEVICE: ICD-10-PCS | Mod: PBBFAC,,,

## 2023-10-26 PROCEDURE — 99999PBSHW POCT HEMOGLOBIN A1C: Mod: PBBFAC,,,

## 2023-10-26 PROCEDURE — 82962 GLUCOSE BLOOD TEST: CPT | Mod: PBBFAC | Performed by: NURSE PRACTITIONER

## 2023-10-26 PROCEDURE — 99214 PR OFFICE/OUTPT VISIT, EST, LEVL IV, 30-39 MIN: ICD-10-PCS | Mod: S$PBB,,, | Performed by: NURSE PRACTITIONER

## 2023-10-26 PROCEDURE — 83036 HEMOGLOBIN GLYCOSYLATED A1C: CPT | Mod: PBBFAC | Performed by: NURSE PRACTITIONER

## 2023-10-26 PROCEDURE — 99999PBSHW POCT GLUCOSE, HAND-HELD DEVICE: Mod: PBBFAC,,,

## 2023-10-26 RX ORDER — DAPAGLIFLOZIN 10 MG/1
10 TABLET, FILM COATED ORAL DAILY
Qty: 90 TABLET | Refills: 1 | Status: SHIPPED | OUTPATIENT
Start: 2023-10-26 | End: 2024-04-02 | Stop reason: SDUPTHER

## 2023-10-26 RX ORDER — LISINOPRIL 40 MG/1
40 TABLET ORAL DAILY
Qty: 90 TABLET | Refills: 1 | Status: SHIPPED | OUTPATIENT
Start: 2023-10-26

## 2023-10-26 RX ORDER — AMLODIPINE BESYLATE 10 MG/1
10 TABLET ORAL DAILY
Qty: 90 TABLET | Refills: 1 | Status: SHIPPED | OUTPATIENT
Start: 2023-10-26

## 2023-10-26 RX ORDER — ROSUVASTATIN CALCIUM 20 MG/1
20 TABLET, COATED ORAL DAILY
Qty: 90 TABLET | Refills: 1 | Status: SHIPPED | OUTPATIENT
Start: 2023-10-26 | End: 2023-12-27

## 2023-10-26 RX ORDER — METFORMIN HYDROCHLORIDE 500 MG/1
1000 TABLET ORAL 2 TIMES DAILY WITH MEALS
Qty: 360 TABLET | Refills: 1 | Status: SHIPPED | OUTPATIENT
Start: 2023-10-26 | End: 2024-04-02 | Stop reason: SDUPTHER

## 2023-10-26 NOTE — PATIENT INSTRUCTIONS
Continue to do good with diet.     Be sure to take the crestor daily for cholesterol and decrease red meats, eggs and cheese.   Increase fiber and exercise.     Snacks with 0-5 grams carbs   Hard Boiled Egg   Crystal Light, Vitamin Water, Powerade Zero   Herbal tea, unsweetened   8 oz unsweetened almond milk   2 tsp peanut butter on celery   ½ cup sugar-free Jell-O   1 sugar-free popsicle   Non starchy vegetables such as carrots or celery sticks with lowfat dressing   ½ oz lowfat cheese or string cheese   1 closed handful of nuts or tbsp of seeds, unsalted    Snacks with 15 gram carbs  . 1 small piece of fruit or . banana or . cup light canned fruit  . 3 charline cracker squares  . 3 cups popcorn  . 5 Vanilla Wafers  . 1/2 cup low fat, no added sugar ice cream or frozen yogurt  . 1/2 turkey, ham, or chicken sandwich  . 1.2 cup fruit with 1/2 cup of cottage cheese  . 4-6 unsalted wheat crackers with 1 oz low fat cheese or 1 tbsp peanut butter  . 30 goldfish crackers  . 7-8 mini rice cakes  . 1/3 cup hummus dip with raw vegetables  . 1/2 whole wheat dalton, 1 tbsp hummus  . Mini pizza (. whole wheat English muffin, low-fat cheese, tomato sauce)  . 100 calorie snack pack  . 4-6 oz light yogurt  . 1/2 cup sugar-free pudding

## 2023-12-25 DIAGNOSIS — E78.5 HYPERLIPIDEMIA, UNSPECIFIED HYPERLIPIDEMIA TYPE: ICD-10-CM

## 2023-12-27 RX ORDER — ROSUVASTATIN CALCIUM 20 MG/1
20 TABLET, COATED ORAL
Qty: 90 TABLET | Refills: 1 | Status: SHIPPED | OUTPATIENT
Start: 2023-12-27

## 2024-01-12 ENCOUNTER — PATIENT OUTREACH (OUTPATIENT)
Dept: ADMINISTRATIVE | Facility: HOSPITAL | Age: 54
End: 2024-01-12

## 2024-01-12 NOTE — LETTER
January 12, 2024    Alley Banks  [address]  [address]  [place] [address]             [facility]  [address] [place] [address] Dear De[facility] is committed to your overall health.  To help you get the most out of each of your visits, we will review your information to make sure you are up to date on all of your recommended tests and or procedures.     I am reaching out to you today to update the Health Maintenance section in your chart.    Your chart shows that you are due for your   Health Maintenance Due   Topic Date Due    Hepatitis C Screening  Never done    Cervical Cancer Screening  Never done    COVID-19 Vaccine (1) Never done    Pneumococcal Vaccines (Age 0-64) (1 - PCV) Never done    Eye Exam  Never done    HIV Screening  Never done    TETANUS VACCINE  Never done    Mammogram  Never done    Colorectal Cancer Screening  Never done    Shingles Vaccine (1 of 2) Never done    Influenza Vaccine (1) Never done    Hemoglobin A1c  01/26/2024        If you have had any of the screenings listed above, please provide us with the name and address of the physician so that we may request the records to update your [facility] chart.     Thank you for letting us care for you!  Bozena Webb FNP and your [facility] Team    Additional Information  If you have questions, please call [phone]  to talk to our [facility] staff. Remember, [facility] is NOT to be used for urgent needs. For medical emergencies, dial 911.

## 2024-01-12 NOTE — PROGRESS NOTES
Population Health Chart Review & Patient Outreach Details    Updates Requested / Reviewed:  [x]  Care Team Updated  [x]  [facility] Reviewed  [x]   Reviewed    Health Maintenance Topics Addressed and Outreach Outcomes / Actions Taken:        Cervical Cancer Screening [x] Telephone Outreach. No answer. Voicemail not available. Letter mailed regarding all over due  Topics.

## 2024-04-02 DIAGNOSIS — E11.9 TYPE 2 DIABETES MELLITUS WITHOUT COMPLICATION, WITHOUT LONG-TERM CURRENT USE OF INSULIN: ICD-10-CM

## 2024-04-02 RX ORDER — DULAGLUTIDE 1.5 MG/.5ML
1.5 INJECTION, SOLUTION SUBCUTANEOUS
Qty: 12 PEN | Refills: 1 | Status: SHIPPED | OUTPATIENT
Start: 2024-04-02 | End: 2025-04-02

## 2024-04-02 RX ORDER — DAPAGLIFLOZIN 10 MG/1
10 TABLET, FILM COATED ORAL DAILY
Qty: 90 TABLET | Refills: 1 | Status: SHIPPED | OUTPATIENT
Start: 2024-04-02

## 2024-04-02 RX ORDER — METFORMIN HYDROCHLORIDE 500 MG/1
1000 TABLET ORAL 2 TIMES DAILY WITH MEALS
Qty: 360 TABLET | Refills: 1 | Status: SHIPPED | OUTPATIENT
Start: 2024-04-02

## 2024-07-16 ENCOUNTER — OFFICE VISIT (OUTPATIENT)
Dept: PRIMARY CARE CLINIC | Facility: CLINIC | Age: 54
End: 2024-07-16
Payer: MEDICARE

## 2024-07-16 VITALS
WEIGHT: 191 LBS | SYSTOLIC BLOOD PRESSURE: 120 MMHG | HEIGHT: 62 IN | BODY MASS INDEX: 35.15 KG/M2 | DIASTOLIC BLOOD PRESSURE: 80 MMHG | TEMPERATURE: 98 F | OXYGEN SATURATION: 97 % | HEART RATE: 86 BPM

## 2024-07-16 DIAGNOSIS — Z12.39 ENCOUNTER FOR SCREENING FOR MALIGNANT NEOPLASM OF BREAST, UNSPECIFIED SCREENING MODALITY: ICD-10-CM

## 2024-07-16 DIAGNOSIS — E11.9 DIABETES MELLITUS WITHOUT COMPLICATION: ICD-10-CM

## 2024-07-16 DIAGNOSIS — E78.5 HYPERLIPIDEMIA, UNSPECIFIED HYPERLIPIDEMIA TYPE: ICD-10-CM

## 2024-07-16 DIAGNOSIS — Z12.31 ENCOUNTER FOR SCREENING MAMMOGRAM FOR MALIGNANT NEOPLASM OF BREAST: ICD-10-CM

## 2024-07-16 DIAGNOSIS — Z11.59 NEED FOR HEPATITIS C SCREENING TEST: ICD-10-CM

## 2024-07-16 DIAGNOSIS — E66.01 SEVERE OBESITY (BMI 35.0-39.9) WITH COMORBIDITY: ICD-10-CM

## 2024-07-16 DIAGNOSIS — E11.9 TYPE 2 DIABETES MELLITUS WITHOUT COMPLICATION, WITHOUT LONG-TERM CURRENT USE OF INSULIN: Primary | ICD-10-CM

## 2024-07-16 DIAGNOSIS — Z11.4 SCREENING FOR HIV (HUMAN IMMUNODEFICIENCY VIRUS): ICD-10-CM

## 2024-07-16 DIAGNOSIS — Z12.11 SCREENING FOR MALIGNANT NEOPLASM OF COLON: ICD-10-CM

## 2024-07-16 DIAGNOSIS — I10 PRIMARY HYPERTENSION: ICD-10-CM

## 2024-07-16 DIAGNOSIS — Z12.4 SCREENING FOR MALIGNANT NEOPLASM OF CERVIX: ICD-10-CM

## 2024-07-16 PROCEDURE — 99214 OFFICE O/P EST MOD 30 MIN: CPT | Mod: ,,, | Performed by: NURSE PRACTITIONER

## 2024-07-16 RX ORDER — DULAGLUTIDE 1.5 MG/.5ML
1.5 INJECTION, SOLUTION SUBCUTANEOUS
Qty: 12 PEN | Refills: 3 | Status: SHIPPED | OUTPATIENT
Start: 2024-07-16

## 2024-07-16 RX ORDER — DEXTROSE 4 G
1 TABLET,CHEWABLE ORAL ONCE
Qty: 1 EACH | Refills: 0 | Status: SHIPPED | OUTPATIENT
Start: 2024-07-16 | End: 2024-07-16

## 2024-07-16 RX ORDER — METFORMIN HYDROCHLORIDE 500 MG/1
1000 TABLET ORAL 2 TIMES DAILY WITH MEALS
Qty: 360 TABLET | Refills: 3 | Status: SHIPPED | OUTPATIENT
Start: 2024-07-16

## 2024-07-16 RX ORDER — DAPAGLIFLOZIN 10 MG/1
10 TABLET, FILM COATED ORAL DAILY
Qty: 90 TABLET | Refills: 3 | Status: SHIPPED | OUTPATIENT
Start: 2024-07-16

## 2024-07-16 RX ORDER — ROSUVASTATIN CALCIUM 20 MG/1
20 TABLET, COATED ORAL NIGHTLY
Qty: 90 TABLET | Refills: 3 | Status: SHIPPED | OUTPATIENT
Start: 2024-07-16

## 2024-07-16 RX ORDER — LISINOPRIL 40 MG/1
40 TABLET ORAL DAILY
Qty: 90 TABLET | Refills: 3 | Status: SHIPPED | OUTPATIENT
Start: 2024-07-16

## 2024-07-16 RX ORDER — AMLODIPINE BESYLATE 10 MG/1
10 TABLET ORAL DAILY
Qty: 90 TABLET | Refills: 3 | Status: SHIPPED | OUTPATIENT
Start: 2024-07-16

## 2024-07-16 NOTE — PROGRESS NOTES
Subjective     Patient ID: Alley Banks is a 54 y.o. female.    Chief Complaint: Follow-up    Pt presents to establish care.       Review of Systems   Constitutional:  Negative for activity change, appetite change, chills, fatigue and fever.   HENT:  Negative for nasal congestion, ear discharge, nosebleeds, postnasal drip, rhinorrhea, sinus pressure/congestion, sneezing, sore throat and tinnitus.    Eyes:  Negative for pain, discharge, redness and itching.   Respiratory:  Negative for cough, choking, chest tightness, shortness of breath and wheezing.    Cardiovascular:  Negative for chest pain.   Gastrointestinal:  Negative for abdominal distention, abdominal pain, blood in stool, change in bowel habit, constipation, diarrhea, nausea and vomiting.   Genitourinary:  Negative for decreased urine volume, dysuria, flank pain and frequency.   Musculoskeletal:  Negative for back pain and gait problem.   Integumentary:  Negative for wound, breast mass and breast discharge.   Allergic/Immunologic: Negative for immunocompromised state.   Neurological:  Negative for dizziness, light-headedness and headaches.   Psychiatric/Behavioral:  Negative for agitation, behavioral problems and hallucinations.    Breast: Negative for mass         Objective     Physical Exam  Vitals and nursing note reviewed.   Constitutional:       Appearance: Normal appearance.   Cardiovascular:      Rate and Rhythm: Normal rate and regular rhythm.      Heart sounds: Normal heart sounds.   Pulmonary:      Effort: Pulmonary effort is normal.      Breath sounds: Normal breath sounds.   Musculoskeletal:         General: Normal range of motion.   Neurological:      Mental Status: She is alert and oriented to person, place, and time.   Psychiatric:         Mood and Affect: Mood normal.         Behavior: Behavior normal.            Assessment and Plan     1. Type 2 diabetes mellitus without complication, without long-term current use of insulin  -      dapagliflozin propanediol (FARXIGA) 10 mg tablet; Take 1 tablet (10 mg total) by mouth once daily.  Dispense: 90 tablet; Refill: 3  -     dulaglutide (TRULICITY) 1.5 mg/0.5 mL pen injector; Inject 1.5 mg into the skin every 7 days.  Dispense: 12 pen ; Refill: 3  -     lisinopriL (PRINIVIL,ZESTRIL) 40 MG tablet; Take 1 tablet (40 mg total) by mouth once daily.  Dispense: 90 tablet; Refill: 3  -     metFORMIN (GLUCOPHAGE) 500 MG tablet; Take 2 tablets (1,000 mg total) by mouth 2 (two) times daily with meals.  Dispense: 360 tablet; Refill: 3  -     lancets 32 gauge Misc; For use with True Metrix lancing device to test blood sugar once daily  Dispense: 100 each; Refill: 3  -     blood-glucose meter (TRUE METRIX GLUCOSE METER) Misc; 1 Application by Misc.(Non-Drug; Combo Route) route once. Use to test sugar once daily for 1 dose  Dispense: 1 each; Refill: 0  -     blood sugar diagnostic (BLOOD GLUCOSE TEST) Strp; For True Metrix meter, to test blood sugar once a day.  Dispense: 100 strip; Refill: 3    2. Need for hepatitis C screening test  -     Hepatitis C Antibody; Future; Expected date: 07/16/2024    3. Diabetes mellitus without complication  -     Hemoglobin A1C; Future; Expected date: 07/16/2024  -     CBC Auto Differential; Future; Expected date: 07/16/2024  -     Comprehensive Metabolic Panel; Future; Expected date: 07/16/2024  -     Lipid Panel; Future; Expected date: 07/16/2024  -     TSH; Future; Expected date: 07/16/2024  -     Microalbumin/Creatinine Ratio, Urine; Future; Expected date: 07/16/2024    4. Screening for HIV (human immunodeficiency virus)  -     HIV 1/2 Ag/Ab (4th Gen); Future; Expected date: 07/16/2024    5. Primary hypertension  -     amLODIPine (NORVASC) 10 MG tablet; Take 1 tablet (10 mg total) by mouth once daily.  Dispense: 90 tablet; Refill: 3    6. Hyperlipidemia, unspecified hyperlipidemia type  -     rosuvastatin (CRESTOR) 20 MG tablet; Take 1 tablet (20 mg total) by mouth every  evening.  Dispense: 90 tablet; Refill: 3    7. Screening for malignant neoplasm of cervix  -     Ambulatory referral/consult to Obstetrics / Gynecology; Future; Expected date: 07/23/2024    8. Encounter for screening for malignant neoplasm of breast, unspecified screening modality  -     Mammo Digital Screening Bilat w/ Solomon; Future; Expected date: 07/16/2024    9. Encounter for screening mammogram for malignant neoplasm of breast  -     Mammo Digital Screening Bilat w/ Solomon; Future; Expected date: 07/16/2024    10. Screening for malignant neoplasm of colon  -     Cologuard Screening (Multitarget Stool DNA); Future; Expected date: 07/16/2024    11. Severe obesity (BMI 35.0-39.9) with comorbidity        Will call pt with lab results. Pt will make and eye appt and have the visit sent here. Gave pt the appointments for the mammogram and pap. Entered an order for a cologuard. Pt declined a pneumonia vaccine.          Follow up in about 6 months (around 1/16/2025).     unknown

## 2024-08-09 DIAGNOSIS — Z71.89 COMPLEX CARE COORDINATION: ICD-10-CM

## 2024-08-20 ENCOUNTER — OFFICE VISIT (OUTPATIENT)
Dept: OBSTETRICS AND GYNECOLOGY | Facility: CLINIC | Age: 54
End: 2024-08-20
Payer: MEDICARE

## 2024-08-20 VITALS
WEIGHT: 200.63 LBS | RESPIRATION RATE: 17 BRPM | HEIGHT: 62 IN | DIASTOLIC BLOOD PRESSURE: 98 MMHG | HEART RATE: 73 BPM | OXYGEN SATURATION: 99 % | BODY MASS INDEX: 36.92 KG/M2 | SYSTOLIC BLOOD PRESSURE: 167 MMHG | TEMPERATURE: 98 F

## 2024-08-20 DIAGNOSIS — N95.2 VAGINAL ATROPHY: ICD-10-CM

## 2024-08-20 DIAGNOSIS — N39.46 MIXED STRESS AND URGE URINARY INCONTINENCE: ICD-10-CM

## 2024-08-20 DIAGNOSIS — Z01.411 ENCNTR FOR GYN EXAM (GENERAL) (ROUTINE) W ABNORMAL FINDINGS: Primary | ICD-10-CM

## 2024-08-20 DIAGNOSIS — Z12.4 SCREENING FOR CERVICAL CANCER: Primary | ICD-10-CM

## 2024-08-20 DIAGNOSIS — Z78.0 POSTMENOPAUSAL: ICD-10-CM

## 2024-08-20 DIAGNOSIS — Z12.4 SCREENING FOR CERVICAL CANCER: ICD-10-CM

## 2024-08-20 DIAGNOSIS — Z12.4 SCREENING FOR MALIGNANT NEOPLASM OF CERVIX: ICD-10-CM

## 2024-08-20 PROCEDURE — G0101 CA SCREEN;PELVIC/BREAST EXAM: HCPCS | Mod: ,,, | Performed by: ADVANCED PRACTICE MIDWIFE

## 2024-08-20 PROCEDURE — G0124 SCREEN C/V THIN LAYER BY MD: HCPCS | Mod: ICN,,, | Performed by: PATHOLOGY

## 2024-08-20 PROCEDURE — G0123 SCREEN CERV/VAG THIN LAYER: HCPCS | Mod: TC,GCY | Performed by: ADVANCED PRACTICE MIDWIFE

## 2024-08-20 RX ORDER — ESTRADIOL 0.1 MG/G
1 CREAM VAGINAL
Qty: 42.5 G | Refills: 3 | Status: SHIPPED | OUTPATIENT
Start: 2024-08-22 | End: 2025-08-22

## 2024-08-20 NOTE — PROGRESS NOTES
"Patient ID:  Alley Banks is a 54 y.o. female      Chief Complaint:   Chief Complaint   Patient presents with    Annual Exam        HPI:  Alley was referred by LEX Truong for a pap and gyn exam.  Alley is a  and is postmenopausal.  She denies any vaginal bleeding but does have some dryness and mixed urinary incontinence. She has backpain and joint pain.   LMP: Patient's last menstrual period was 2022 (exact date).   Sexually active:  no  Contraceptive: n/a      Past Medical History:   Diagnosis Date    Diabetes mellitus, type 2     Hyperlipidemia     Hypertension      Past Surgical History:   Procedure Laterality Date    TUBAL LIGATION         OB History          3    Para   3    Term                AB        Living             SAB        IAB        Ectopic        Multiple        Live Births                     BP (!) 167/98 (BP Location: Right arm)   Pulse 73   Temp 98.2 °F (36.8 °C)   Resp 17   Ht 5' 2" (1.575 m)   Wt 91 kg (200 lb 9.6 oz)   LMP 2022 (Exact Date)   SpO2 99%   BMI 36.69 kg/m²   Wt Readings from Last 3 Encounters:   24 91 kg (200 lb 9.6 oz)   24 86.6 kg (191 lb)   10/26/23 88.8 kg (195 lb 12.8 oz)          ROS:  Review of Systems   Constitutional:  Positive for fatigue.   Respiratory: Negative.     Gastrointestinal: Negative.  Negative for fecal incontinence.   Endocrine: Positive for diabetes.   Genitourinary:  Positive for bladder incontinence and vaginal dryness. Negative for postmenopausal bleeding.   Musculoskeletal:  Positive for arthralgias, back pain, joint swelling, leg pain and myalgias.   Integumentary:  Negative.   Neurological: Negative.    Psychiatric/Behavioral:  The patient is nervous/anxious.           PHYSICAL EXAM:  Physical Exam  Vitals and nursing note reviewed.   Constitutional:       General: She is not in acute distress.     Appearance: Normal appearance. She is not ill-appearing.   HENT:      Head: " Normocephalic.      Nose: Nose normal.      Mouth/Throat:      Mouth: Mucous membranes are moist.   Eyes:      Extraocular Movements: Extraocular movements intact.   Cardiovascular:      Rate and Rhythm: Normal rate.      Pulses: Normal pulses.   Pulmonary:      Effort: Pulmonary effort is normal.   Chest:      Chest wall: No mass, lacerations, deformity, swelling, tenderness or edema.   Breasts:     Right: Normal. No swelling, bleeding, inverted nipple, mass, nipple discharge, skin change or tenderness.      Left: Normal. No swelling, bleeding, inverted nipple, mass, nipple discharge, skin change or tenderness.   Abdominal:      Tenderness: There is no abdominal tenderness. There is no guarding.   Genitourinary:     General: Normal vulva.      Exam position: Lithotomy position.      Labia:         Right: No rash, tenderness or lesion.         Left: No rash, tenderness or lesion.       Urethra: No prolapse.      Vagina: No vaginal discharge.      Cervix: Normal. No cervical motion tenderness or friability.      Uterus: Normal.       Adnexa: Right adnexa normal and left adnexa normal.      Comments: Vaginal mucosa pale with decreased rugae.  Musculoskeletal:         General: Normal range of motion.      Cervical back: Normal range of motion and neck supple. No tenderness.      Right lower leg: No edema.      Left lower leg: No edema.   Lymphadenopathy:      Upper Body:      Right upper body: No supraclavicular or axillary adenopathy.      Left upper body: No supraclavicular or axillary adenopathy.   Skin:     General: Skin is warm and dry.   Neurological:      Mental Status: She is alert and oriented to person, place, and time.   Psychiatric:         Mood and Affect: Mood normal.         Behavior: Behavior normal.         Thought Content: Thought content normal.         Judgment: Judgment normal.          Assessment:  Alley was seen today for annual exam.    Diagnoses and all orders for this visit:    Encntr for gyn  exam (general) (routine) w abnormal findings    Screening for malignant neoplasm of cervix  -     Ambulatory referral/consult to Obstetrics / Gynecology    Vaginal atrophy  -     estradioL (ESTRACE) 0.01 % (0.1 mg/gram) vaginal cream; Place 1 g vaginally twice a week.    Mixed stress and urge urinary incontinence    Postmenopausal  -     estradioL (ESTRACE) 0.01 % (0.1 mg/gram) vaginal cream; Place 1 g vaginally twice a week.    Body mass index (BMI) 36.0-36.9, adult          ICD-10-CM ICD-9-CM    1. Encntr for gyn exam (general) (routine) w abnormal findings  Z01.411 V72.31       2. Screening for malignant neoplasm of cervix  Z12.4 V76.2 Ambulatory referral/consult to Obstetrics / Gynecology      3. Vaginal atrophy  N95.2 627.3 estradioL (ESTRACE) 0.01 % (0.1 mg/gram) vaginal cream      4. Mixed stress and urge urinary incontinence  N39.46 788.33       5. Postmenopausal  Z78.0 V49.81 estradioL (ESTRACE) 0.01 % (0.1 mg/gram) vaginal cream      6. Body mass index (BMI) 36.0-36.9, adult  Z68.36 V85.36           Plan:Rx for Estrace to insert twice weekly  Diet , no fried foods, eat more green veggies, less potatoes, rice, pasta.  Walking encouraged  Take bp meds each day.  She forgot this morning.      Follow up in about 1 year (around 8/20/2025).

## 2024-08-23 LAB
GH SERPL-MCNC: ABNORMAL NG/ML
INSULIN SERPL-ACNC: ABNORMAL U[IU]/ML
LAB AP CLINICAL INFORMATION: ABNORMAL
LAB AP GYN INTERPRETATION: ABNORMAL
LAB AP PAP DISCLAIMER COMMENTS: ABNORMAL
RENIN PLAS-CCNC: ABNORMAL NG/ML/H

## 2024-09-04 ENCOUNTER — TELEPHONE (OUTPATIENT)
Dept: OBSTETRICS AND GYNECOLOGY | Facility: CLINIC | Age: 54
End: 2024-09-04
Payer: MEDICARE

## 2024-09-04 DIAGNOSIS — R87.610 ASCUS WITH POSITIVE HIGH RISK HPV CERVICAL: Primary | ICD-10-CM

## 2024-09-04 DIAGNOSIS — R87.810 ASCUS WITH POSITIVE HIGH RISK HPV CERVICAL: Primary | ICD-10-CM

## 2024-09-04 NOTE — TELEPHONE ENCOUNTER
----- Message from Valeria Uriarte CNM sent at 9/4/2024  9:31 AM CDT -----  Review with pt.as ASCUS pap and positive other HR HPV.  Refer to Dr. Avilez for a colpo

## 2024-10-14 ENCOUNTER — HOSPITAL ENCOUNTER (OUTPATIENT)
Dept: RADIOLOGY | Facility: HOSPITAL | Age: 54
Discharge: HOME | End: 2024-10-14
Attending: NURSE PRACTITIONER
Payer: MEDICAID

## 2024-10-14 DIAGNOSIS — Z12.39 ENCOUNTER FOR SCREENING FOR MALIGNANT NEOPLASM OF BREAST, UNSPECIFIED SCREENING MODALITY: ICD-10-CM

## 2024-10-14 DIAGNOSIS — Z12.31 ENCOUNTER FOR SCREENING MAMMOGRAM FOR MALIGNANT NEOPLASM OF BREAST: ICD-10-CM

## 2024-10-14 PROCEDURE — 77063 BREAST TOMOSYNTHESIS BI: CPT | Mod: 26,,, | Performed by: RADIOLOGY

## 2024-10-14 PROCEDURE — 77063 BREAST TOMOSYNTHESIS BI: CPT | Mod: TC

## 2024-10-14 PROCEDURE — 77067 SCR MAMMO BI INCL CAD: CPT | Mod: 26,,, | Performed by: RADIOLOGY

## 2024-10-14 PROCEDURE — 77067 SCR MAMMO BI INCL CAD: CPT | Mod: TC

## 2024-10-23 ENCOUNTER — PROCEDURE VISIT (OUTPATIENT)
Dept: OBSTETRICS AND GYNECOLOGY | Facility: CLINIC | Age: 54
End: 2024-10-23
Payer: MEDICARE

## 2024-10-23 VITALS
HEART RATE: 90 BPM | HEIGHT: 62 IN | BODY MASS INDEX: 37.36 KG/M2 | SYSTOLIC BLOOD PRESSURE: 186 MMHG | TEMPERATURE: 98 F | RESPIRATION RATE: 18 BRPM | WEIGHT: 203 LBS | DIASTOLIC BLOOD PRESSURE: 99 MMHG | OXYGEN SATURATION: 98 %

## 2024-10-23 DIAGNOSIS — R87.810 ASCUS WITH POSITIVE HIGH RISK HPV CERVICAL: ICD-10-CM

## 2024-10-23 DIAGNOSIS — R87.610 ASCUS WITH POSITIVE HIGH RISK HPV CERVICAL: ICD-10-CM

## 2024-10-23 DIAGNOSIS — Z01.818 PRE-OP TESTING: Primary | ICD-10-CM

## 2024-10-23 LAB
B-HCG UR QL: NEGATIVE
CTP QC/QA: YES

## 2024-10-23 PROCEDURE — 99999PBSHW POCT URINE PREGNANCY: Mod: PBBFAC,,,

## 2024-10-23 PROCEDURE — 88305 TISSUE EXAM BY PATHOLOGIST: CPT | Mod: 26,,, | Performed by: PATHOLOGY

## 2024-10-23 PROCEDURE — 88342 IMHCHEM/IMCYTCHM 1ST ANTB: CPT | Mod: TC,SUR | Performed by: OBSTETRICS & GYNECOLOGY

## 2024-10-23 PROCEDURE — 81025 URINE PREGNANCY TEST: CPT | Mod: PBBFAC | Performed by: OBSTETRICS & GYNECOLOGY

## 2024-10-23 PROCEDURE — 57454 BX/CURETT OF CERVIX W/SCOPE: CPT | Mod: PBBFAC | Performed by: OBSTETRICS & GYNECOLOGY

## 2024-10-23 PROCEDURE — 88305 TISSUE EXAM BY PATHOLOGIST: CPT | Mod: TC,91,SUR | Performed by: OBSTETRICS & GYNECOLOGY

## 2024-10-23 NOTE — PROCEDURES
Colposcopy W/BIOPSY AND ECC- Future    Date/Time: 10/23/2024 1:30 PM    Performed by: Priscila Avilez MD  Authorized by: Valeria Uriarte CNM    Consent obatined:  Prior to procedure the appropriate consent was completed and verified  Timeout:Immediately prior to procedure a time out was called to verify the correct patient, procedure, equipment, support staff and site/side marked as required  Prep:Patient was prepped and draped in the usual sterile fashion    Colposcopy Site:  Cervix  Position:  Supine   Patient was prepped and draped in the normal sterile fashion.  Acrowhite Lesion? Yes    Atypical Vessels? Yes    Transformation Zone Adequate?: Yes    Biopsy?: Yes         Location:  Cervix ((2 00))  ECC Performed?: Yes    LEEP Performed?: No    Estimated blood loss (cc):  3   Patient tolerated the procedure well with no immediate complications.   Post-operative instructions were provided for the patient.   Patient was discharged and will follow up if any complications occur     Colposcopy Procedure Note    Colposcopy Procedure Note    Indications: Pt presents for colposcopy secondary to ASCUS HPV+ other    Procedure Details   The risks and benefits of the procedure were discussed in detail with the patient.  She was aware the risks may include, but are not limited to bleeding, infection and damage to surround structures.  She acknowledged these risks and elected to proceed.  Written consent was obtained.    Urine HCG testing (today): neg    A speculum was placed in vagina and excellent visualization of cervix was achieved, the cervix was swabbed x3 with acetic acid solution. 1 acetowhite lesion with atypical vessels were visualized. Biopsies were obtained at 2 o'clock.  ECC was performed.  Colposcopy was adequate.    Findings:  Cervix: atypical vessels noted at 2 o'clock.  Vaginal inspection: Normal without gross visible lesions  Vulvar inspection: Normal without gross visible lesions      Specimens: cervical  biopsy at 2 o'clock and ECC.    Complications: none.    Plan:  Specimens labeled and sent to Pathology.  Treatment plan will be discussed with results in 2 weeks.

## 2024-10-25 LAB
DHEA SERPL-MCNC: NORMAL
ESTROGEN SERPL-MCNC: NORMAL PG/ML
INSULIN SERPL-ACNC: NORMAL U[IU]/ML
LAB AP CLINICAL INFORMATION: NORMAL
LAB AP GROSS DESCRIPTION: NORMAL
LAB AP LABORATORY NOTES: NORMAL
T3RU NFR SERPL: NORMAL %

## 2024-10-31 ENCOUNTER — TELEPHONE (OUTPATIENT)
Dept: OBSTETRICS AND GYNECOLOGY | Facility: CLINIC | Age: 54
End: 2024-10-31
Payer: MEDICARE

## 2025-01-16 ENCOUNTER — OFFICE VISIT (OUTPATIENT)
Dept: FAMILY MEDICINE | Facility: CLINIC | Age: 55
End: 2025-01-16
Payer: MEDICARE

## 2025-01-16 VITALS
DIASTOLIC BLOOD PRESSURE: 70 MMHG | OXYGEN SATURATION: 96 % | BODY MASS INDEX: 36.99 KG/M2 | RESPIRATION RATE: 18 BRPM | WEIGHT: 201 LBS | HEART RATE: 84 BPM | HEIGHT: 62 IN | TEMPERATURE: 99 F | SYSTOLIC BLOOD PRESSURE: 110 MMHG

## 2025-01-16 DIAGNOSIS — E11.9 DIABETES MELLITUS WITHOUT COMPLICATION: ICD-10-CM

## 2025-01-16 DIAGNOSIS — I10 PRIMARY HYPERTENSION: Primary | ICD-10-CM

## 2025-01-16 DIAGNOSIS — E66.01 SEVERE OBESITY (BMI 35.0-39.9) WITH COMORBIDITY: ICD-10-CM

## 2025-01-16 DIAGNOSIS — Z23 NEED FOR VACCINATION: ICD-10-CM

## 2025-01-16 DIAGNOSIS — E78.5 HYPERLIPIDEMIA, UNSPECIFIED HYPERLIPIDEMIA TYPE: ICD-10-CM

## 2025-01-16 LAB
ALBUMIN SERPL BCP-MCNC: 3.8 G/DL (ref 3.5–5)
ALBUMIN/GLOB SERPL: 1 {RATIO}
ALP SERPL-CCNC: 64 U/L (ref 40–150)
ALT SERPL W P-5'-P-CCNC: 24 U/L
ANION GAP SERPL CALCULATED.3IONS-SCNC: 11 MMOL/L (ref 7–16)
AST SERPL W P-5'-P-CCNC: 33 U/L (ref 5–34)
BASOPHILS # BLD AUTO: 0.06 K/UL (ref 0–0.2)
BASOPHILS NFR BLD AUTO: 0.5 % (ref 0–1)
BILIRUB SERPL-MCNC: 0.2 MG/DL
BUN SERPL-MCNC: 23 MG/DL (ref 10–20)
BUN/CREAT SERPL: 28 (ref 6–20)
CALCIUM SERPL-MCNC: 9.4 MG/DL (ref 8.4–10.2)
CHLORIDE SERPL-SCNC: 106 MMOL/L (ref 98–107)
CHOLEST SERPL-MCNC: 165 MG/DL
CHOLEST/HDLC SERPL: 2.3 {RATIO}
CO2 SERPL-SCNC: 23 MMOL/L (ref 22–29)
CREAT SERPL-MCNC: 0.82 MG/DL (ref 0.55–1.02)
DIFFERENTIAL METHOD BLD: ABNORMAL
EGFR (NO RACE VARIABLE) (RUSH/TITUS): 85 ML/MIN/1.73M2
EOSINOPHIL # BLD AUTO: 0.14 K/UL (ref 0–0.5)
EOSINOPHIL NFR BLD AUTO: 1.2 % (ref 1–4)
ERYTHROCYTE [DISTWIDTH] IN BLOOD BY AUTOMATED COUNT: 13.9 % (ref 11.5–14.5)
EST. AVERAGE GLUCOSE BLD GHB EST-MCNC: 154 MG/DL
GLOBULIN SER-MCNC: 4 G/DL (ref 2–4)
GLUCOSE SERPL-MCNC: 139 MG/DL (ref 74–100)
HBA1C MFR BLD HPLC: 7 %
HCT VFR BLD AUTO: 36.5 % (ref 38–47)
HDLC SERPL-MCNC: 72 MG/DL (ref 35–60)
HGB BLD-MCNC: 11.2 G/DL (ref 12–16)
IMM GRANULOCYTES # BLD AUTO: 0.03 K/UL (ref 0–0.04)
IMM GRANULOCYTES NFR BLD: 0.3 % (ref 0–0.4)
LDLC SERPL CALC-MCNC: 63 MG/DL
LYMPHOCYTES # BLD AUTO: 2.19 K/UL (ref 1–4.8)
LYMPHOCYTES NFR BLD AUTO: 18.9 % (ref 27–41)
MCH RBC QN AUTO: 28.2 PG (ref 27–31)
MCHC RBC AUTO-ENTMCNC: 30.7 G/DL (ref 32–36)
MCV RBC AUTO: 91.9 FL (ref 80–96)
MONOCYTES # BLD AUTO: 0.86 K/UL (ref 0–0.8)
MONOCYTES NFR BLD AUTO: 7.4 % (ref 2–6)
MPC BLD CALC-MCNC: 10.7 FL (ref 9.4–12.4)
NEUTROPHILS # BLD AUTO: 8.32 K/UL (ref 1.8–7.7)
NEUTROPHILS NFR BLD AUTO: 71.7 % (ref 53–65)
NONHDLC SERPL-MCNC: 93 MG/DL
NRBC # BLD AUTO: 0 X10E3/UL
NRBC, AUTO (.00): 0 %
PLATELET # BLD AUTO: 356 K/UL (ref 150–400)
POTASSIUM SERPL-SCNC: 4.1 MMOL/L (ref 3.5–5.1)
PROT SERPL-MCNC: 7.8 G/DL (ref 6.4–8.3)
RBC # BLD AUTO: 3.97 M/UL (ref 4.2–5.4)
SODIUM SERPL-SCNC: 136 MMOL/L (ref 136–145)
TRIGL SERPL-MCNC: 150 MG/DL (ref 37–140)
TSH SERPL DL<=0.005 MIU/L-ACNC: 2.72 UIU/ML (ref 0.35–4.94)
VLDLC SERPL-MCNC: 30 MG/DL
WBC # BLD AUTO: 11.6 K/UL (ref 4.5–11)

## 2025-01-16 PROCEDURE — 1160F RVW MEDS BY RX/DR IN RCRD: CPT | Mod: ,,, | Performed by: NURSE PRACTITIONER

## 2025-01-16 PROCEDURE — 80050 GENERAL HEALTH PANEL: CPT | Mod: ,,, | Performed by: CLINICAL MEDICAL LABORATORY

## 2025-01-16 PROCEDURE — G0008 ADMIN INFLUENZA VIRUS VAC: HCPCS | Mod: ,,, | Performed by: NURSE PRACTITIONER

## 2025-01-16 PROCEDURE — G0009 ADMIN PNEUMOCOCCAL VACCINE: HCPCS | Mod: ,,, | Performed by: NURSE PRACTITIONER

## 2025-01-16 PROCEDURE — 3008F BODY MASS INDEX DOCD: CPT | Mod: ,,, | Performed by: NURSE PRACTITIONER

## 2025-01-16 PROCEDURE — 3078F DIAST BP <80 MM HG: CPT | Mod: ,,, | Performed by: NURSE PRACTITIONER

## 2025-01-16 PROCEDURE — 83036 HEMOGLOBIN GLYCOSYLATED A1C: CPT | Mod: ,,, | Performed by: CLINICAL MEDICAL LABORATORY

## 2025-01-16 PROCEDURE — 99214 OFFICE O/P EST MOD 30 MIN: CPT | Mod: 25,,, | Performed by: NURSE PRACTITIONER

## 2025-01-16 PROCEDURE — 3074F SYST BP LT 130 MM HG: CPT | Mod: ,,, | Performed by: NURSE PRACTITIONER

## 2025-01-16 PROCEDURE — 80061 LIPID PANEL: CPT | Mod: ,,, | Performed by: CLINICAL MEDICAL LABORATORY

## 2025-01-16 PROCEDURE — 1159F MED LIST DOCD IN RCRD: CPT | Mod: ,,, | Performed by: NURSE PRACTITIONER

## 2025-01-16 PROCEDURE — 90677 PCV20 VACCINE IM: CPT | Mod: ,,, | Performed by: NURSE PRACTITIONER

## 2025-01-16 PROCEDURE — 90656 IIV3 VACC NO PRSV 0.5 ML IM: CPT | Mod: ,,, | Performed by: NURSE PRACTITIONER

## 2025-01-16 NOTE — PROGRESS NOTES
Subjective     Patient ID: Alley Banks is a 54 y.o. female.    Chief Complaint: Diabetes, Hypertension (6 month f/u.), and Knee Pain (Left knee pain.regular flu and prevnar 20 given.)    Pt presents for a diabetes follow-up.       Review of Systems   Constitutional:  Negative for activity change, appetite change, chills, fatigue and fever.   HENT:  Negative for nasal congestion, ear discharge, nosebleeds, postnasal drip, rhinorrhea, sinus pressure/congestion, sneezing, sore throat and tinnitus.    Eyes:  Negative for pain, discharge, redness and itching.   Respiratory:  Negative for cough, choking, chest tightness, shortness of breath and wheezing.    Cardiovascular:  Negative for chest pain.   Gastrointestinal:  Negative for abdominal distention, abdominal pain, blood in stool, change in bowel habit, constipation, diarrhea, nausea and vomiting.   Genitourinary:  Negative for decreased urine volume, dysuria, flank pain and frequency.   Musculoskeletal:  Negative for back pain and gait problem.   Integumentary:  Negative for wound, breast mass and breast discharge.   Allergic/Immunologic: Negative for immunocompromised state.   Neurological:  Negative for dizziness, light-headedness and headaches.   Psychiatric/Behavioral:  Negative for agitation, behavioral problems and hallucinations.    Breast: Negative for mass         Objective     Physical Exam  Vitals and nursing note reviewed.   Constitutional:       Appearance: Normal appearance.   Cardiovascular:      Rate and Rhythm: Normal rate and regular rhythm.      Heart sounds: Normal heart sounds.   Pulmonary:      Effort: Pulmonary effort is normal.      Breath sounds: Normal breath sounds.   Musculoskeletal:         General: Normal range of motion.   Neurological:      Mental Status: She is alert and oriented to person, place, and time.   Psychiatric:         Mood and Affect: Mood normal.         Behavior: Behavior normal.            Assessment and Plan     1.  Primary hypertension  -     CBC Auto Differential; Future; Expected date: 01/16/2025  -     Comprehensive Metabolic Panel; Future; Expected date: 01/16/2025  -     Lipid Panel; Future; Expected date: 01/16/2025  -     TSH; Future; Expected date: 01/16/2025    2. Diabetes mellitus without complication  -     Hemoglobin A1C; Future; Expected date: 01/16/2025    3. Severe obesity (BMI 35.0-39.9) with comorbidity    4. Hyperlipidemia, unspecified hyperlipidemia type    5. Need for vaccination  -     pneumoc 20-ben conj-dip cr(PF) (PREVNAR-20 (PF)) injection Syrg 0.5 mL  -     influenza (Flulaval, Fluzone, Fluarix) 45 mcg/0.5 mL IM vaccine (> or = 6 mo) 0.5 mL        Will call pt with lab results. Pt will make an eye appt and have the visit sent here.          Follow up in about 6 months (around 7/16/2025).

## 2025-01-17 DIAGNOSIS — E11.9 DIABETES MELLITUS WITHOUT COMPLICATION: Primary | ICD-10-CM

## 2025-01-17 RX ORDER — DULAGLUTIDE 3 MG/.5ML
3 INJECTION, SOLUTION SUBCUTANEOUS
Qty: 12 PEN | Refills: 3 | Status: SHIPPED | OUTPATIENT
Start: 2025-01-17

## 2025-05-06 LAB
LEFT EYE DM RETINOPATHY: NEGATIVE
RIGHT EYE DM RETINOPATHY: NEGATIVE

## 2025-05-23 ENCOUNTER — PATIENT OUTREACH (OUTPATIENT)
Facility: HOSPITAL | Age: 55
End: 2025-05-23
Payer: MEDICARE

## 2025-05-23 NOTE — PROGRESS NOTES
Population Health Chart Review & Patient Outreach Details    Updates Requested / Reviewed:  [x]  Care Team Updated    Health Maintenance Topics Addressed and Outreach Outcomes / Actions Taken:  Diabetic Eye Exam [x] HM Updated with May 2025 Eye Exam (Dr. He). History Updated.

## 2025-06-23 NOTE — PROGRESS NOTES
"  Alley Banks presented for a follow-up Medicare AWV today. The following components were reviewed and updated:    Medical history  Family History  Social history  Allergies and Current Medications  Health Risk Assessment  Health Maintenance  Care Team    **See Completed Assessments for Annual Wellness visit with in the encounter summary    The following assessments were completed:  Depression Screening  Cognitive function Screening  Timed Get Up Test  Whisper Test        Opioid documentation:      Patient does not have a current opioid prescription.          Vitals:    06/30/25 1406 06/30/25 1444   BP: (!) 142/94 (!) 150/98   BP Location: Right arm    Patient Position: Sitting    Pulse: 76    Resp: 16    Temp: 98.6 °F (37 °C)    TempSrc: Oral    SpO2: 98%    Weight: 88.9 kg (196 lb)    Height: 5' 2" (1.575 m)      Body mass index is 35.85 kg/m².       Physical Exam  Constitutional:       Appearance: Normal appearance.   HENT:      Head: Normocephalic.      Nose: Nose normal.   Cardiovascular:      Rate and Rhythm: Normal rate and regular rhythm.      Heart sounds: Normal heart sounds.   Pulmonary:      Breath sounds: Normal breath sounds.   Skin:     General: Skin is warm and dry.   Neurological:      Mental Status: She is alert and oriented to person, place, and time. Mental status is at baseline.   Psychiatric:         Mood and Affect: Mood normal.         Behavior: Behavior normal.           Diagnoses and health risks identified today and associated recommendations/orders:  1. Encounter for subsequent annual wellness visit (AWV) in Medicare patient  Assessment & Plan:  Follow up in 1 year for AWV  Take all medications as directed.   Keep scheduled appt with PCP  Declines vaccinations.  Cologuard at home- will complete      2. Primary hypertension  Assessment & Plan:  /98  Lisinopril  Amlodipine  Follow up with PCP for elevated BP      3. Hyperlipidemia, unspecified hyperlipidemia type  Assessment & " Plan:  Low cholesterol diet  Continue statin      4. Type 2 diabetes mellitus without complication, without long-term current use of insulin  Assessment & Plan:  Trulicity  Farxiga  Metformin  Follow up with PCP for A1C and MicroAlbumin    Orders:  -     Cancel: Ambulatory referral/consult to Podiatry; Future; Expected date: 07/07/2025  -     Ambulatory referral/consult to Podiatry; Future; Expected date: 07/07/2025    5. Other reduced mobility  Assessment & Plan:  Activity as tolerated.      6. Weight loss, abnormal  Assessment & Plan:  High protein diet  Frequent meals.          Health Maintenance Due   Topic Date Due    TETANUS VACCINE  11/04/2003    Colorectal Cancer Screening  Never done    Shingles Vaccine (1 of 2) Never done    COVID-19 Vaccine (1 - 2024-25 season) Never done    Foot Exam  10/26/2024    Diabetes Urine Screening  07/16/2025    Hemoglobin A1c  07/16/2025      Health Maintenance   Topic Date Due    TETANUS VACCINE  11/04/2003    Colorectal Cancer Screening  Never done    Shingles Vaccine (1 of 2) Never done    COVID-19 Vaccine (1 - 2024-25 season) Never done    Foot Exam  10/26/2024    Diabetes Urine Screening  07/16/2025    Hemoglobin A1c  07/16/2025    Mammogram  10/14/2025    Lipid Panel  01/16/2026    Diabetic Eye Exam  05/06/2026    Low Dose Statin  06/30/2026    Cervical Cancer Screening  08/20/2029    RSV Vaccine (Age 60+ and Pregnant patients) (1 - 1-dose 75+ series) 04/01/2045    Hepatitis C Screening  Completed    Influenza Vaccine  Completed    HIV Screening  Completed    Pneumococcal Vaccines (Age 50+)  Completed      Health Maintenance Topics with due status: Not Due       Topic Last Completion Date    Cervical Cancer Screening 08/20/2024    Mammogram 10/14/2024    Lipid Panel 01/16/2025    Diabetic Eye Exam 05/06/2025    Low Dose Statin 06/30/2025    RSV Vaccine (Age 60+ and Pregnant patients) Not Due      Tetanus vaccine - declined, Colorectal Cancer Screening - states has  Cologuard Kit at home, Shingles vaccine -declined , Covid vaccine - declined, Foot Exam -Podiatry referral scheduled with appointment given .    Provided Alley with a 5-10 year written screening schedule and personal prevention plan. Recommendations were developed using the USPSTF age appropriate recommendations. Education, counseling, and referrals were provided as needed.  After Visit Summary printed and given to patient which includes a list of additional screenings\tests needed.    Follow up in about 1 year (around 6/30/2026).      LEX Bedolla      I offered to discuss advanced care planning, including how to pick a person who would make decisions for you if you were unable to make them for yourself, called a health care power of , and what kind of decisions you might make such as use of life sustaining treatments such as ventilators and tube feeding when faced with a life limiting illness recorded on a living will that they will need to know. (How you want to be cared for as you near the end of your natural life)     X Patient is interested in learning more about how to make advanced directives.  I provided them paperwork and offered to discuss this with them.

## 2025-06-30 ENCOUNTER — OFFICE VISIT (OUTPATIENT)
Dept: FAMILY MEDICINE | Facility: CLINIC | Age: 55
End: 2025-06-30
Payer: MEDICARE

## 2025-06-30 VITALS
WEIGHT: 196 LBS | RESPIRATION RATE: 16 BRPM | TEMPERATURE: 99 F | SYSTOLIC BLOOD PRESSURE: 150 MMHG | HEART RATE: 76 BPM | DIASTOLIC BLOOD PRESSURE: 98 MMHG | OXYGEN SATURATION: 98 % | HEIGHT: 62 IN | BODY MASS INDEX: 36.07 KG/M2

## 2025-06-30 DIAGNOSIS — E78.5 HYPERLIPIDEMIA, UNSPECIFIED HYPERLIPIDEMIA TYPE: ICD-10-CM

## 2025-06-30 DIAGNOSIS — I10 PRIMARY HYPERTENSION: ICD-10-CM

## 2025-06-30 DIAGNOSIS — Z74.09 OTHER REDUCED MOBILITY: ICD-10-CM

## 2025-06-30 DIAGNOSIS — R63.4 WEIGHT LOSS, ABNORMAL: ICD-10-CM

## 2025-06-30 DIAGNOSIS — Z00.00 ENCOUNTER FOR SUBSEQUENT ANNUAL WELLNESS VISIT (AWV) IN MEDICARE PATIENT: Primary | ICD-10-CM

## 2025-06-30 DIAGNOSIS — E11.9 TYPE 2 DIABETES MELLITUS WITHOUT COMPLICATION, WITHOUT LONG-TERM CURRENT USE OF INSULIN: ICD-10-CM

## 2025-06-30 PROCEDURE — 3080F DIAST BP >= 90 MM HG: CPT | Mod: ,,, | Performed by: NURSE PRACTITIONER

## 2025-06-30 PROCEDURE — 3051F HG A1C>EQUAL 7.0%<8.0%: CPT | Mod: ,,, | Performed by: NURSE PRACTITIONER

## 2025-06-30 PROCEDURE — 96160 PT-FOCUSED HLTH RISK ASSMT: CPT | Mod: ,,, | Performed by: NURSE PRACTITIONER

## 2025-06-30 PROCEDURE — 2023F DILAT RTA XM W/O RTNOPTHY: CPT | Mod: ,,, | Performed by: NURSE PRACTITIONER

## 2025-06-30 PROCEDURE — 3077F SYST BP >= 140 MM HG: CPT | Mod: ,,, | Performed by: NURSE PRACTITIONER

## 2025-06-30 PROCEDURE — 4010F ACE/ARB THERAPY RXD/TAKEN: CPT | Mod: ,,, | Performed by: NURSE PRACTITIONER

## 2025-06-30 PROCEDURE — G0439 PPPS, SUBSEQ VISIT: HCPCS | Mod: ,,, | Performed by: NURSE PRACTITIONER

## 2025-06-30 PROCEDURE — 1159F MED LIST DOCD IN RCRD: CPT | Mod: ,,, | Performed by: NURSE PRACTITIONER

## 2025-06-30 NOTE — ASSESSMENT & PLAN NOTE
Follow up in 1 year for AWV  Take all medications as directed.   Keep scheduled appt with PCP  Declines vaccinations.  Cologuard at home- will complete

## 2025-07-18 DIAGNOSIS — E78.5 HYPERLIPIDEMIA, UNSPECIFIED HYPERLIPIDEMIA TYPE: ICD-10-CM

## 2025-07-18 DIAGNOSIS — E11.9 TYPE 2 DIABETES MELLITUS WITHOUT COMPLICATION, WITHOUT LONG-TERM CURRENT USE OF INSULIN: ICD-10-CM

## 2025-07-18 RX ORDER — ROSUVASTATIN CALCIUM 20 MG/1
20 TABLET, COATED ORAL NIGHTLY
Qty: 90 TABLET | Refills: 0 | Status: SHIPPED | OUTPATIENT
Start: 2025-07-18

## 2025-07-18 RX ORDER — LISINOPRIL 40 MG/1
40 TABLET ORAL
Qty: 90 TABLET | Refills: 0 | Status: SHIPPED | OUTPATIENT
Start: 2025-07-18

## 2025-07-21 ENCOUNTER — OFFICE VISIT (OUTPATIENT)
Dept: FAMILY MEDICINE | Facility: CLINIC | Age: 55
End: 2025-07-21
Payer: MEDICARE

## 2025-07-21 VITALS
HEIGHT: 62 IN | SYSTOLIC BLOOD PRESSURE: 128 MMHG | TEMPERATURE: 99 F | WEIGHT: 197 LBS | OXYGEN SATURATION: 97 % | BODY MASS INDEX: 36.25 KG/M2 | HEART RATE: 86 BPM | DIASTOLIC BLOOD PRESSURE: 80 MMHG | RESPIRATION RATE: 18 BRPM

## 2025-07-21 DIAGNOSIS — E11.9 TYPE 2 DIABETES MELLITUS WITHOUT COMPLICATION, WITHOUT LONG-TERM CURRENT USE OF INSULIN: ICD-10-CM

## 2025-07-21 DIAGNOSIS — E78.5 HYPERLIPIDEMIA, UNSPECIFIED HYPERLIPIDEMIA TYPE: ICD-10-CM

## 2025-07-21 DIAGNOSIS — I10 PRIMARY HYPERTENSION: Primary | ICD-10-CM

## 2025-07-21 PROCEDURE — 82043 UR ALBUMIN QUANTITATIVE: CPT | Mod: ,,, | Performed by: CLINICAL MEDICAL LABORATORY

## 2025-07-21 PROCEDURE — 99214 OFFICE O/P EST MOD 30 MIN: CPT | Mod: ,,, | Performed by: NURSE PRACTITIONER

## 2025-07-21 PROCEDURE — 3008F BODY MASS INDEX DOCD: CPT | Mod: ,,, | Performed by: NURSE PRACTITIONER

## 2025-07-21 PROCEDURE — 80050 GENERAL HEALTH PANEL: CPT | Mod: ,,, | Performed by: CLINICAL MEDICAL LABORATORY

## 2025-07-21 PROCEDURE — 2023F DILAT RTA XM W/O RTNOPTHY: CPT | Mod: ,,, | Performed by: NURSE PRACTITIONER

## 2025-07-21 PROCEDURE — 1160F RVW MEDS BY RX/DR IN RCRD: CPT | Mod: ,,, | Performed by: NURSE PRACTITIONER

## 2025-07-21 PROCEDURE — 3074F SYST BP LT 130 MM HG: CPT | Mod: ,,, | Performed by: NURSE PRACTITIONER

## 2025-07-21 PROCEDURE — 1159F MED LIST DOCD IN RCRD: CPT | Mod: ,,, | Performed by: NURSE PRACTITIONER

## 2025-07-21 PROCEDURE — 3079F DIAST BP 80-89 MM HG: CPT | Mod: ,,, | Performed by: NURSE PRACTITIONER

## 2025-07-21 PROCEDURE — 3051F HG A1C>EQUAL 7.0%<8.0%: CPT | Mod: ,,, | Performed by: NURSE PRACTITIONER

## 2025-07-21 PROCEDURE — 82570 ASSAY OF URINE CREATININE: CPT | Mod: ,,, | Performed by: CLINICAL MEDICAL LABORATORY

## 2025-07-21 PROCEDURE — 83036 HEMOGLOBIN GLYCOSYLATED A1C: CPT | Mod: ,,, | Performed by: CLINICAL MEDICAL LABORATORY

## 2025-07-21 PROCEDURE — 4010F ACE/ARB THERAPY RXD/TAKEN: CPT | Mod: ,,, | Performed by: NURSE PRACTITIONER

## 2025-07-21 PROCEDURE — 80061 LIPID PANEL: CPT | Mod: ,,, | Performed by: CLINICAL MEDICAL LABORATORY

## 2025-07-21 RX ORDER — METFORMIN HYDROCHLORIDE 500 MG/1
1000 TABLET ORAL 2 TIMES DAILY WITH MEALS
Qty: 360 TABLET | Refills: 3 | Status: SHIPPED | OUTPATIENT
Start: 2025-07-21

## 2025-07-21 RX ORDER — LISINOPRIL 40 MG/1
40 TABLET ORAL DAILY
Qty: 90 TABLET | Refills: 3 | Status: SHIPPED | OUTPATIENT
Start: 2025-07-21

## 2025-07-21 RX ORDER — AMLODIPINE BESYLATE 10 MG/1
10 TABLET ORAL DAILY
Qty: 90 TABLET | Refills: 3 | Status: SHIPPED | OUTPATIENT
Start: 2025-07-21

## 2025-07-21 RX ORDER — ROSUVASTATIN CALCIUM 20 MG/1
20 TABLET, COATED ORAL NIGHTLY
Qty: 90 TABLET | Refills: 3 | Status: SHIPPED | OUTPATIENT
Start: 2025-07-21

## 2025-07-21 NOTE — PROGRESS NOTES
Subjective     Patient ID: Alley Banks is a 55 y.o. female.    Chief Complaint: Hypertension, Diabetes, and Health Maintenance (TETANUS VACCINE unknown/Colorectal Cancer Screening declined/Shingles Vaccine(1 of 2) has not taken/COVID-19 Vaccine(1 - 2024-25 season) did not take/Foot Exam due on 10/26/2024/Diabetes Urine Screening due on 07/16/2025/Hemoglobin A1c due on 07/16/2025/Mammogram scheduled for 10/21/205 @10:40)    Pt presents for a hypertension follow-up.   Protective Sensation (w/ 10 gram monofilament):  Right: Intact  Left: Intact    Visual Inspection:  Normal -  Bilateral    Pedal Pulses:   Right: Present  Left: Present    Posterior Tibialis Pulses:   Right:Present  Left: Present         Review of Systems   Constitutional:  Negative for activity change, appetite change, chills, fatigue and fever.   HENT:  Negative for nasal congestion, ear discharge, nosebleeds, postnasal drip, rhinorrhea, sinus pressure/congestion, sneezing, sore throat and tinnitus.    Eyes:  Negative for pain, discharge, redness and itching.   Respiratory:  Negative for cough, choking, chest tightness, shortness of breath and wheezing.    Cardiovascular:  Negative for chest pain.   Gastrointestinal:  Negative for abdominal distention, abdominal pain, blood in stool, change in bowel habit, constipation, diarrhea, nausea and vomiting.   Genitourinary:  Negative for decreased urine volume, dysuria, flank pain and frequency.   Musculoskeletal:  Negative for back pain and gait problem.   Integumentary:  Negative for wound, breast mass and breast discharge.   Allergic/Immunologic: Negative for immunocompromised state.   Neurological:  Negative for dizziness, light-headedness and headaches.   Psychiatric/Behavioral:  Negative for agitation, behavioral problems and hallucinations.    Breast: Negative for mass         Objective     Physical Exam  Vitals and nursing note reviewed.   Constitutional:       Appearance: Normal appearance.    Cardiovascular:      Rate and Rhythm: Normal rate and regular rhythm.      Heart sounds: Normal heart sounds.   Pulmonary:      Effort: Pulmonary effort is normal.      Breath sounds: Normal breath sounds.   Musculoskeletal:         General: Normal range of motion.   Feet:      Right foot:      Protective Sensation: 10 sites tested.  10 sites sensed.      Left foot:      Protective Sensation: 10 sites tested.  10 sites sensed.   Neurological:      Mental Status: She is alert and oriented to person, place, and time.   Psychiatric:         Mood and Affect: Mood normal.         Behavior: Behavior normal.            Assessment and Plan     1. Primary hypertension  -     amLODIPine (NORVASC) 10 MG tablet; Take 1 tablet (10 mg total) by mouth once daily.  Dispense: 90 tablet; Refill: 3  Controlled at 128/80  Low sodium diet    2. Hyperlipidemia, unspecified hyperlipidemia type  -     rosuvastatin (CRESTOR) 20 MG tablet; Take 1 tablet (20 mg total) by mouth every evening.  Dispense: 90 tablet; Refill: 3  Low cholesterol diet  Avoid fried foods    3. Type 2 diabetes mellitus without complication, without long-term current use of insulin  -     metFORMIN (GLUCOPHAGE) 500 MG tablet; Take 2 tablets (1,000 mg total) by mouth 2 (two) times daily with meals.  Dispense: 360 tablet; Refill: 3  -     lisinopriL (PRINIVIL,ZESTRIL) 40 MG tablet; Take 1 tablet (40 mg total) by mouth once daily.  Dispense: 90 tablet; Refill: 3  -     Microalbumin/Creatinine Ratio, Urine; Future; Expected date: 07/21/2025  -     Hemoglobin A1C; Future; Expected date: 07/21/2025  -     CBC Auto Differential; Future; Expected date: 07/21/2025  -     Comprehensive Metabolic Panel; Future; Expected date: 07/21/2025  -     TSH; Future; Expected date: 07/21/2025  -     Lipid Panel; Future; Expected date: 07/21/2025  Low sugar diet  Exercise 3-5 times weekly      Will call pt with lab results. Pt declined a colonoscopy and a cologuard.          Follow up in  about 6 months (around 1/21/2026).

## 2025-07-22 LAB
ALBUMIN SERPL BCP-MCNC: 4.1 G/DL (ref 3.5–5)
ALBUMIN/GLOB SERPL: 1 {RATIO}
ALP SERPL-CCNC: 58 U/L (ref 40–150)
ALT SERPL W P-5'-P-CCNC: 18 U/L
ANION GAP SERPL CALCULATED.3IONS-SCNC: 14 MMOL/L (ref 7–16)
AST SERPL W P-5'-P-CCNC: 24 U/L (ref 11–45)
BASOPHILS # BLD AUTO: 0.05 K/UL (ref 0–0.2)
BASOPHILS NFR BLD AUTO: 0.5 % (ref 0–1)
BILIRUB SERPL-MCNC: 0.2 MG/DL
BUN SERPL-MCNC: 16 MG/DL (ref 10–20)
BUN/CREAT SERPL: 21 (ref 6–20)
CALCIUM SERPL-MCNC: 9.7 MG/DL (ref 8.4–10.2)
CHLORIDE SERPL-SCNC: 105 MMOL/L (ref 98–107)
CHOLEST SERPL-MCNC: 177 MG/DL
CHOLEST/HDLC SERPL: 2.2 {RATIO}
CO2 SERPL-SCNC: 23 MMOL/L (ref 22–29)
CREAT SERPL-MCNC: 0.76 MG/DL (ref 0.55–1.02)
CREAT UR-MCNC: 89 MG/DL (ref 15–325)
DIFFERENTIAL METHOD BLD: ABNORMAL
EGFR (NO RACE VARIABLE) (RUSH/TITUS): 93 ML/MIN/1.73M2
EOSINOPHIL # BLD AUTO: 0.22 K/UL (ref 0–0.5)
EOSINOPHIL NFR BLD AUTO: 2.2 % (ref 1–4)
ERYTHROCYTE [DISTWIDTH] IN BLOOD BY AUTOMATED COUNT: 14.7 % (ref 11.5–14.5)
EST. AVERAGE GLUCOSE BLD GHB EST-MCNC: 123 MG/DL
GLOBULIN SER-MCNC: 4.2 G/DL (ref 2–4)
GLUCOSE SERPL-MCNC: 98 MG/DL (ref 74–100)
HBA1C MFR BLD HPLC: 5.9 %
HCT VFR BLD AUTO: 42.5 % (ref 38–47)
HDLC SERPL-MCNC: 81 MG/DL (ref 35–60)
HGB BLD-MCNC: 12.2 G/DL (ref 12–16)
IMM GRANULOCYTES # BLD AUTO: 0.04 K/UL (ref 0–0.04)
IMM GRANULOCYTES NFR BLD: 0.4 % (ref 0–0.4)
LDLC SERPL CALC-MCNC: 74 MG/DL
LYMPHOCYTES # BLD AUTO: 1.69 K/UL (ref 1–4.8)
LYMPHOCYTES NFR BLD AUTO: 16.9 % (ref 27–41)
MCH RBC QN AUTO: 28.5 PG (ref 27–31)
MCHC RBC AUTO-ENTMCNC: 28.7 G/DL (ref 32–36)
MCV RBC AUTO: 99.3 FL (ref 80–96)
MICROALBUMIN UR-MCNC: 1.8 MG/DL
MICROALBUMIN/CREAT RATIO PNL UR: 20.2 MG/G (ref 0–30)
MONOCYTES # BLD AUTO: 0.75 K/UL (ref 0–0.8)
MONOCYTES NFR BLD AUTO: 7.5 % (ref 2–6)
MPC BLD CALC-MCNC: 10.2 FL (ref 9.4–12.4)
NEUTROPHILS # BLD AUTO: 7.25 K/UL (ref 1.8–7.7)
NEUTROPHILS NFR BLD AUTO: 72.5 % (ref 53–65)
NONHDLC SERPL-MCNC: 96 MG/DL
NRBC # BLD AUTO: 0 X10E3/UL
NRBC, AUTO (.00): 0 %
PLATELET # BLD AUTO: 342 K/UL (ref 150–400)
POTASSIUM SERPL-SCNC: 4.6 MMOL/L (ref 3.5–5.1)
PROT SERPL-MCNC: 8.3 G/DL (ref 6.4–8.3)
RBC # BLD AUTO: 4.28 M/UL (ref 4.2–5.4)
SODIUM SERPL-SCNC: 137 MMOL/L (ref 136–145)
TRIGL SERPL-MCNC: 108 MG/DL (ref 37–140)
TSH SERPL DL<=0.005 MIU/L-ACNC: 1.99 UIU/ML (ref 0.35–4.94)
VLDLC SERPL-MCNC: 22 MG/DL
WBC # BLD AUTO: 10 K/UL (ref 4.5–11)

## 2025-08-26 ENCOUNTER — OFFICE VISIT (OUTPATIENT)
Facility: CLINIC | Age: 55
End: 2025-08-26
Payer: MEDICARE

## 2025-08-26 VITALS
SYSTOLIC BLOOD PRESSURE: 133 MMHG | HEART RATE: 66 BPM | OXYGEN SATURATION: 99 % | WEIGHT: 199.81 LBS | RESPIRATION RATE: 17 BRPM | DIASTOLIC BLOOD PRESSURE: 84 MMHG | BODY MASS INDEX: 36.77 KG/M2 | HEIGHT: 62 IN

## 2025-08-26 DIAGNOSIS — R10.2 PELVIC PAIN: ICD-10-CM

## 2025-08-26 DIAGNOSIS — Z12.4 SCREENING FOR CERVICAL CANCER: ICD-10-CM

## 2025-08-26 DIAGNOSIS — Z01.411 ENCOUNTER FOR GYNECOLOGICAL EXAMINATION (GENERAL) (ROUTINE) WITH ABNORMAL FINDINGS: Primary | ICD-10-CM

## 2025-08-26 DIAGNOSIS — Z78.0 POSTMENOPAUSAL: ICD-10-CM

## 2025-08-26 LAB
BILIRUB SERPL-MCNC: NORMAL MG/DL
BLOOD URINE, POC: NEGATIVE
CLARITY, UA: NORMAL
COLOR, UA: YELLOW
GLUCOSE UR QL STRIP: NEGATIVE
KETONES UR QL STRIP: NORMAL
LEUKOCYTE ESTERASE URINE, POC: NEGATIVE
NITRITE, POC UA: NEGATIVE
PH, POC UA: 6
PROTEIN, POC: NORMAL
SPECIFIC GRAVITY, POC UA: >=1.03
UROBILINOGEN, POC UA: 0.2

## 2025-08-26 PROCEDURE — 4010F ACE/ARB THERAPY RXD/TAKEN: CPT | Mod: ,,, | Performed by: ADVANCED PRACTICE MIDWIFE

## 2025-08-26 PROCEDURE — 3066F NEPHROPATHY DOC TX: CPT | Mod: ,,, | Performed by: ADVANCED PRACTICE MIDWIFE

## 2025-08-26 PROCEDURE — 3008F BODY MASS INDEX DOCD: CPT | Mod: ,,, | Performed by: ADVANCED PRACTICE MIDWIFE

## 2025-08-26 PROCEDURE — 3075F SYST BP GE 130 - 139MM HG: CPT | Mod: ,,, | Performed by: ADVANCED PRACTICE MIDWIFE

## 2025-08-26 PROCEDURE — 1159F MED LIST DOCD IN RCRD: CPT | Mod: ,,, | Performed by: ADVANCED PRACTICE MIDWIFE

## 2025-08-26 PROCEDURE — 3061F NEG MICROALBUMINURIA REV: CPT | Mod: ,,, | Performed by: ADVANCED PRACTICE MIDWIFE

## 2025-08-26 PROCEDURE — 3044F HG A1C LEVEL LT 7.0%: CPT | Mod: ,,, | Performed by: ADVANCED PRACTICE MIDWIFE

## 2025-08-26 PROCEDURE — 3079F DIAST BP 80-89 MM HG: CPT | Mod: ,,, | Performed by: ADVANCED PRACTICE MIDWIFE

## 2025-08-26 PROCEDURE — 99396 PREV VISIT EST AGE 40-64: CPT | Mod: ,,, | Performed by: ADVANCED PRACTICE MIDWIFE

## 2025-08-28 LAB
GH SERPL-MCNC: NORMAL NG/ML
INSULIN SERPL-ACNC: NORMAL U[IU]/ML
LAB AP CLINICAL INFORMATION: NORMAL
LAB AP GYN INTERPRETATION: NEGATIVE
LAB AP PAP DISCLAIMER COMMENTS: NORMAL
RENIN PLAS-CCNC: NORMAL NG/ML/H

## 2025-08-29 LAB
HPV 16: NEGATIVE
HPV 18: NEGATIVE
HPV OTHER: POSITIVE

## 2025-09-02 ENCOUNTER — TELEPHONE (OUTPATIENT)
Facility: CLINIC | Age: 55
End: 2025-09-02
Payer: MEDICARE